# Patient Record
Sex: FEMALE | Race: WHITE | NOT HISPANIC OR LATINO | Employment: OTHER | URBAN - METROPOLITAN AREA
[De-identification: names, ages, dates, MRNs, and addresses within clinical notes are randomized per-mention and may not be internally consistent; named-entity substitution may affect disease eponyms.]

---

## 2017-12-21 ENCOUNTER — OFFICE VISIT (OUTPATIENT)
Dept: OBSTETRICS AND GYNECOLOGY | Age: 62
End: 2017-12-21

## 2017-12-21 VITALS
DIASTOLIC BLOOD PRESSURE: 90 MMHG | HEIGHT: 68 IN | WEIGHT: 147 LBS | BODY MASS INDEX: 22.28 KG/M2 | SYSTOLIC BLOOD PRESSURE: 132 MMHG

## 2017-12-21 DIAGNOSIS — Z12.4 ROUTINE CERVICAL SMEAR: ICD-10-CM

## 2017-12-21 DIAGNOSIS — Z11.51 SPECIAL SCREENING EXAMINATION FOR HUMAN PAPILLOMAVIRUS (HPV): ICD-10-CM

## 2017-12-21 DIAGNOSIS — B37.31 VULVOVAGINITIS DUE TO CANDIDA: ICD-10-CM

## 2017-12-21 DIAGNOSIS — B37.31 CANDIDA VAGINITIS: ICD-10-CM

## 2017-12-21 DIAGNOSIS — Z01.89 ENCOUNTER FOR URINE TEST: ICD-10-CM

## 2017-12-21 DIAGNOSIS — Z01.419 WELL WOMAN EXAM WITH ROUTINE GYNECOLOGICAL EXAM: Primary | ICD-10-CM

## 2017-12-21 LAB
BILIRUB BLD-MCNC: NEGATIVE MG/DL
CLARITY, POC: CLEAR
COLOR UR: YELLOW
GLUCOSE UR STRIP-MCNC: ABNORMAL MG/DL
KETONES UR QL: NEGATIVE
LEUKOCYTE EST, POC: NEGATIVE
NITRITE UR-MCNC: NEGATIVE MG/ML
PH UR: 5.5 [PH] (ref 5–8)
PROT UR STRIP-MCNC: NEGATIVE MG/DL
RBC # UR STRIP: NEGATIVE /UL
SP GR UR: 1.02 (ref 1–1.03)
UROBILINOGEN UR QL: NORMAL

## 2017-12-21 PROCEDURE — 81002 URINALYSIS NONAUTO W/O SCOPE: CPT | Performed by: OBSTETRICS & GYNECOLOGY

## 2017-12-21 PROCEDURE — 99386 PREV VISIT NEW AGE 40-64: CPT | Performed by: OBSTETRICS & GYNECOLOGY

## 2017-12-21 RX ORDER — VENLAFAXINE 25 MG/1
25 TABLET ORAL 2 TIMES DAILY
COMMUNITY
End: 2018-07-26

## 2017-12-21 RX ORDER — LAMOTRIGINE 100 MG/1
100 TABLET ORAL DAILY
COMMUNITY

## 2017-12-21 RX ORDER — FLUCONAZOLE 150 MG/1
TABLET ORAL
Qty: 3 TABLET | Refills: 2 | Status: SHIPPED | OUTPATIENT
Start: 2017-12-21 | End: 2018-01-26

## 2017-12-21 RX ORDER — LISINOPRIL 5 MG/1
5 TABLET ORAL DAILY
COMMUNITY
End: 2021-08-18

## 2017-12-21 RX ORDER — TRAZODONE HYDROCHLORIDE 50 MG/1
50 TABLET ORAL EVERY MORNING
COMMUNITY
End: 2019-05-21

## 2017-12-21 RX ORDER — SIMVASTATIN 10 MG
10 TABLET ORAL NIGHTLY
COMMUNITY
End: 2019-05-21

## 2017-12-27 LAB
CYTOLOGIST CVX/VAG CYTO: ABNORMAL
CYTOLOGY CVX/VAG DOC THIN PREP: ABNORMAL
DX ICD CODE: ABNORMAL
DX ICD CODE: ABNORMAL
HIV 1 & 2 AB SER-IMP: ABNORMAL
HPV I/H RISK 4 DNA CVX QL PROBE+SIG AMP: NEGATIVE
OTHER STN SPEC: ABNORMAL
PATH REPORT.FINAL DX SPEC: ABNORMAL
PATHOLOGIST CVX/VAG CYTO: ABNORMAL
STAT OF ADQ CVX/VAG CYTO-IMP: ABNORMAL

## 2017-12-28 LAB
A VAGINAE DNA VAG QL NAA+PROBE: ABNORMAL SCORE
BVAB2 DNA VAG QL NAA+PROBE: ABNORMAL SCORE
C ALBICANS DNA VAG QL NAA+PROBE: POSITIVE
C GLABRATA DNA VAG QL NAA+PROBE: NEGATIVE
MEGA1 DNA VAG QL NAA+PROBE: ABNORMAL SCORE
T VAGINALIS RRNA SPEC QL NAA+PROBE: NEGATIVE

## 2018-01-02 ENCOUNTER — HOSPITAL ENCOUNTER (OUTPATIENT)
Dept: MAMMOGRAPHY | Facility: HOSPITAL | Age: 63
Discharge: HOME | End: 2018-01-02
Attending: OBSTETRICS & GYNECOLOGY | Admitting: OBSTETRICS & GYNECOLOGY

## 2018-01-02 ENCOUNTER — HOSPITAL ENCOUNTER (OUTPATIENT)
Dept: ULTRASOUND IMAGING | Facility: HOSPITAL | Age: 63
Discharge: HOME | End: 2018-01-02

## 2018-01-02 ENCOUNTER — TELEPHONE (OUTPATIENT)
Dept: OBSTETRICS AND GYNECOLOGY | Age: 63
End: 2018-01-02

## 2018-01-02 DIAGNOSIS — R92.8 MAMMOGRAM ABNORMAL: ICD-10-CM

## 2018-01-02 DIAGNOSIS — R92.1 BREAST CALCIFICATION SEEN ON MAMMOGRAM: ICD-10-CM

## 2018-01-02 DIAGNOSIS — N64.89 BREAST ASYMMETRY: ICD-10-CM

## 2018-01-02 DIAGNOSIS — R92.2 BREAST DENSITY: ICD-10-CM

## 2018-01-02 PROCEDURE — 76642 ULTRASOUND BREAST LIMITED: CPT

## 2018-01-02 PROCEDURE — 77065 DX MAMMO INCL CAD UNI: CPT

## 2018-01-02 NOTE — TELEPHONE ENCOUNTER
----- Message from Malik Pandey MD sent at 12/31/2017  4:42 PM EST -----  Notify Shy that the vaginal NuSwab test did show Candida albicans, most common cause of yeast infections in women.  The Diflucan should clear this up.  But also the Pap smear came back and showed dysplasia but was negative for high-risk HPV.  This might be secondary to the yeast infection.  After she is finished the Diflucan we need to look at her cervix with the colposcope, sometime in January, to make sure there is not any significant dysplasia present.

## 2018-01-09 ENCOUNTER — TELEPHONE (OUTPATIENT)
Dept: MAMMOGRAPHY | Age: 63
End: 2018-01-09

## 2018-01-09 NOTE — TELEPHONE ENCOUNTER
Informed pt her follow up mammogram recommends Left breast stereotactic biopsy and Left Ultrasound guided biopsy, along with 6 month ultrasound follow up on Right breast.  [facility] 1130.   Orders in EPIC for biopsies and 6 month follow up.

## 2018-01-18 ENCOUNTER — HOSPITAL ENCOUNTER (OUTPATIENT)
Dept: ULTRASOUND IMAGING | Facility: HOSPITAL | Age: 63
End: 2018-01-18

## 2018-01-19 ENCOUNTER — HOSPITAL ENCOUNTER (OUTPATIENT)
Dept: MAMMOGRAPHY | Facility: HOSPITAL | Age: 63
Discharge: HOME | End: 2018-01-19
Attending: OBSTETRICS & GYNECOLOGY

## 2018-01-26 ENCOUNTER — HOSPITAL ENCOUNTER (OUTPATIENT)
Dept: MAMMOGRAPHY | Facility: HOSPITAL | Age: 63
Discharge: HOME | End: 2018-01-26
Attending: OBSTETRICS & GYNECOLOGY

## 2018-01-26 ENCOUNTER — HOSPITAL ENCOUNTER (OUTPATIENT)
Dept: ULTRASOUND IMAGING | Facility: HOSPITAL | Age: 63
Discharge: HOME | End: 2018-01-26
Attending: OBSTETRICS & GYNECOLOGY | Admitting: OBSTETRICS & GYNECOLOGY

## 2018-01-26 VITALS
TEMPERATURE: 97.4 F | OXYGEN SATURATION: 98 % | WEIGHT: 144 LBS | HEIGHT: 68 IN | RESPIRATION RATE: 18 BRPM | SYSTOLIC BLOOD PRESSURE: 123 MMHG | BODY MASS INDEX: 21.82 KG/M2 | HEART RATE: 98 BPM | DIASTOLIC BLOOD PRESSURE: 70 MMHG

## 2018-01-26 VITALS
OXYGEN SATURATION: 95 % | DIASTOLIC BLOOD PRESSURE: 77 MMHG | HEART RATE: 101 BPM | RESPIRATION RATE: 18 BRPM | SYSTOLIC BLOOD PRESSURE: 124 MMHG

## 2018-01-26 DIAGNOSIS — N63.0 BREAST MASS: ICD-10-CM

## 2018-01-26 DIAGNOSIS — R92.1 BREAST CALCIFICATION SEEN ON MAMMOGRAM: ICD-10-CM

## 2018-01-26 PROCEDURE — 88305 TISSUE EXAM BY PATHOLOGIST: CPT | Performed by: OBSTETRICS & GYNECOLOGY

## 2018-01-26 PROCEDURE — A4648 IMPLANTABLE TISSUE MARKER: HCPCS

## 2018-01-26 RX ORDER — LIDOCAINE HYDROCHLORIDE 10 MG/ML
20 INJECTION, SOLUTION INFILTRATION; PERINEURAL ONCE
Status: COMPLETED | OUTPATIENT
Start: 2018-01-26 | End: 2018-01-26

## 2018-01-26 RX ADMIN — LIDOCAINE HYDROCHLORIDE 9 ML: 10 INJECTION, SOLUTION INFILTRATION; PERINEURAL at 14:25

## 2018-01-26 RX ADMIN — LIDOCAINE HYDROCHLORIDE 20 ML: 10 INJECTION, SOLUTION INFILTRATION; PERINEURAL at 13:25

## 2018-01-26 NOTE — PROGRESS NOTES
See VS pre op for sterotactic breast bx  Taken about 1200 for complete set of VS. Pt having 2 separate breast bx today, US to follow sterotactic

## 2018-01-27 ENCOUNTER — TELEPHONE (OUTPATIENT)
Dept: INTERVENTIONAL RADIOLOGY/VASCULAR | Facility: HOSPITAL | Age: 63
End: 2018-01-27

## 2018-01-29 LAB
CYTO UR: NORMAL
LAB AP CASE REPORT: NORMAL
LAB AP CLINICAL INFORMATION: NORMAL
Lab: NORMAL
PATH REPORT.FINAL DX SPEC: NORMAL
PATH REPORT.GROSS SPEC: NORMAL

## 2018-01-30 ENCOUNTER — TELEPHONE (OUTPATIENT)
Dept: OBSTETRICS AND GYNECOLOGY | Age: 63
End: 2018-01-30

## 2018-02-18 DIAGNOSIS — R92.8 ABNORMALITY OF BOTH BREASTS ON SCREENING MAMMOGRAM: Primary | ICD-10-CM

## 2018-02-18 DIAGNOSIS — Z98.890 S/P LEFT BREAST BIOPSY: ICD-10-CM

## 2018-06-26 ENCOUNTER — HOSPITAL ENCOUNTER (OUTPATIENT)
Dept: ULTRASOUND IMAGING | Facility: HOSPITAL | Age: 63
Discharge: HOME | End: 2018-06-26
Attending: OBSTETRICS & GYNECOLOGY | Admitting: OBSTETRICS & GYNECOLOGY

## 2018-06-26 ENCOUNTER — OFFICE VISIT (OUTPATIENT)
Dept: OBSTETRICS AND GYNECOLOGY | Age: 63
End: 2018-06-26

## 2018-06-26 ENCOUNTER — HOSPITAL ENCOUNTER (OUTPATIENT)
Dept: MAMMOGRAPHY | Facility: HOSPITAL | Age: 63
Discharge: HOME | End: 2018-06-26
Attending: OBSTETRICS & GYNECOLOGY

## 2018-06-26 VITALS
SYSTOLIC BLOOD PRESSURE: 132 MMHG | DIASTOLIC BLOOD PRESSURE: 86 MMHG | WEIGHT: 142 LBS | HEIGHT: 62 IN | BODY MASS INDEX: 26.13 KG/M2

## 2018-06-26 DIAGNOSIS — Z98.890 S/P LEFT BREAST BIOPSY: ICD-10-CM

## 2018-06-26 DIAGNOSIS — N60.01 BREAST CYST, RIGHT: ICD-10-CM

## 2018-06-26 DIAGNOSIS — IMO0001 IDDM (INSULIN DEPENDENT DIABETES MELLITUS): ICD-10-CM

## 2018-06-26 DIAGNOSIS — R87.612 LGSIL OF CERVIX OF UNDETERMINED SIGNIFICANCE: ICD-10-CM

## 2018-06-26 DIAGNOSIS — A63.0 GENITAL WARTS: ICD-10-CM

## 2018-06-26 DIAGNOSIS — Z72.0 TOBACCO ABUSE: ICD-10-CM

## 2018-06-26 DIAGNOSIS — R92.8 ABNORMALITY OF BOTH BREASTS ON SCREENING MAMMOGRAM: ICD-10-CM

## 2018-06-26 DIAGNOSIS — Z00.00 ROUTINE HEALTH MAINTENANCE: Primary | ICD-10-CM

## 2018-06-26 PROCEDURE — 77066 DX MAMMO INCL CAD BI: CPT

## 2018-06-26 PROCEDURE — 76642 ULTRASOUND BREAST LIMITED: CPT

## 2018-06-26 PROCEDURE — 99213 OFFICE O/P EST LOW 20 MIN: CPT | Performed by: PHYSICIAN ASSISTANT

## 2018-06-26 RX ORDER — BLOOD-GLUCOSE METER
1 KIT MISCELLANEOUS 4 TIMES DAILY
Qty: 1 EACH | Refills: 0 | Status: SHIPPED | OUTPATIENT
Start: 2018-06-26

## 2018-06-26 RX ORDER — ERGOCALCIFEROL 1.25 MG/1
CAPSULE ORAL
Refills: 0 | COMMUNITY
Start: 2018-05-23 | End: 2019-05-21

## 2018-06-26 RX ORDER — IMIQUIMOD 12.5 MG/.25G
CREAM TOPICAL 3 TIMES WEEKLY
Qty: 24 EACH | Refills: 1 | Status: SHIPPED | OUTPATIENT
Start: 2018-06-27 | End: 2018-09-25 | Stop reason: SDUPTHER

## 2018-06-26 NOTE — PROGRESS NOTES
"Subjective     Chief Complaint   Patient presents with   • Gynecologic Exam     c/o vaginal bumps needs rx for blood monitor and strips is diabetic       Shy Yates is a 63 y.o.  whose LMP is No LMP recorded. Patient is postmenopausal. presents with skin tag and lesions on her labia.  Says she has had something similar to this on her face as well.  She first started with them a few years ago.  She feels like she gets pimples that won't go away.  She did end up seeing a Plastic Surgeon for this (Dr wright) and he has removed one before and said it was carcinoma. She hasn't been back.  She is diabetic and sees Dr Hanna.  BS have been stable.  Past year sugars were high.    She denies STD risk  She denies prodromal sx's.  Seen for first visit with Dr Pandey in December. Moved from [place].   for 33 years.   2 ya. Has a new partner.  Denies h/o abnormal pap    No Additional Complaints Reported    The following portions of the patient's history were reviewed and updated as appropriate:vital signs, allergies, current medications, past family history, past medical history, past social history, past surgical history and problem list      Review of Systems   A comprehensive review of systems was negative except for: Genitourinary: positive for genital lesions     Objective      /86   Ht 157.5 cm (62\")   Wt 64.4 kg (142 lb)   BMI 25.97 kg/m²     Physical Exam    General:   alert, comfortable and no distress   Heart: Not performed today   Lungs: Not performed today.   Breast: Not performed today   Neck: na   Abdomen: {Not performed today   CVA: Not performed today   Pelvis: External genitalia: warts throughout labia majora and minora and perirectal  Vaginal: normal mucosa without prolapse or lesions  Cervix: normal appearance   Extremities: Not performed today   Neurologic: negative   Psychiatric: Normal affect, judgement, and mood       Lab Review   Labs: pap reviewed from Dec, LGSIL, " colpo indicated     Imaging   No data reviewed    Assessment/Plan     ASSESSMENT  1. Routine health maintenance    2. Genital warts    3. LGSIL of cervix of undetermined significance    4. IDDM (insulin dependent diabetes mellitus)    5. Tobacco abuse        PLAN  1.   Orders Placed This Encounter   Procedures   • Ambulatory Referral to Gastroenterology       2. Medications prescribed this encounter:        New Medications Ordered This Visit   Medications   • imiquimod (ALDARA) 5 % cream     Sig: Apply  topically 3 (Three) Times a Week. Qhs, wash off in am     Dispense:  24 each     Refill:  1   • glucose monitor monitoring kit     Si each 4 (Four) Times a Day.     Dispense:  1 each     Refill:  0     Requests Accucheck   • glucose blood test strip     Sig: Use as instructed     Dispense:  120 each     Refill:  12       3. Disc findings with pt.  Will start with aldara for 3 months.  May require laser tx.  She needs to schedule her colpo with Dr hillman and can reassess the warts at that time and possibly discuss surgical tx.  Pt was pretty surprised/upset by this finding.  I did give her info to review and encouraged her to discuss her dx with her boyfriend. Also discussed how a stable BS would improve/prevent outbreaks and enc tob cessation for the same reason. All questions answered    4. Pt requested a referal for a CSC. Also requested an order for a glucometer and test strips     Follow up: 1 month(s) for colpo    YANE Valdes  2018

## 2018-06-27 ENCOUNTER — TELEPHONE (OUTPATIENT)
Dept: OBSTETRICS AND GYNECOLOGY | Age: 63
End: 2018-06-27

## 2018-07-11 ENCOUNTER — PROCEDURE VISIT (OUTPATIENT)
Dept: OBSTETRICS AND GYNECOLOGY | Age: 63
End: 2018-07-11

## 2018-07-11 VITALS
WEIGHT: 145 LBS | SYSTOLIC BLOOD PRESSURE: 138 MMHG | HEIGHT: 62 IN | DIASTOLIC BLOOD PRESSURE: 80 MMHG | BODY MASS INDEX: 26.68 KG/M2

## 2018-07-11 DIAGNOSIS — Z11.51 SCREENING FOR HUMAN PAPILLOMAVIRUS: ICD-10-CM

## 2018-07-11 DIAGNOSIS — R87.619 ABNORMAL CYTOLOGICAL FINDING IN SPECIMEN FROM CERVIX UTERI: ICD-10-CM

## 2018-07-11 DIAGNOSIS — N87.9 CERVICAL DYSPLASIA: ICD-10-CM

## 2018-07-11 DIAGNOSIS — Z91.89 HIGH RISK FOR CERVICAL CANCER: ICD-10-CM

## 2018-07-11 DIAGNOSIS — Z12.4 ENCOUNTER FOR REPEAT PAPANICOLAOU SMEAR OF CERVIX: ICD-10-CM

## 2018-07-11 DIAGNOSIS — R87.612 PAP SMEAR ABNORMALITY OF CERVIX WITH LGSIL: Primary | ICD-10-CM

## 2018-07-11 PROCEDURE — 56501 DESTROY VULVA LESIONS SIM: CPT | Performed by: OBSTETRICS & GYNECOLOGY

## 2018-07-11 PROCEDURE — 57454 BX/CURETT OF CERVIX W/SCOPE: CPT | Performed by: OBSTETRICS & GYNECOLOGY

## 2018-07-11 RX ORDER — BLOOD-GLUCOSE METER
EACH MISCELLANEOUS 4 TIMES DAILY
Refills: 0 | COMMUNITY
Start: 2018-06-26

## 2018-07-12 NOTE — PROGRESS NOTES
"COLPOSCOPY EXAM                         7/11/2018     Subjective        Shy Yates is a 63 y.o., G 5, P 4014, White  Female    Indication: LGSIL on Pap smear in December 2017.  It was negative for HR-HPV.  We had called with this report but there was no answer and so we sent a letter, but apparently the patient never sought, for she did not realize she had mild dysplasia on her previous Pap smear.  It is the first abnormal Pap smear she has had.    In addition the patient had an outbreak of multiple genital warts on both labia majora and perianally.  This was a first time event.  She is treating the area with Aldara with some response.    Pap Smear History: Never had abnormal Pap smears.  2015 had negative Pap smear, negative HR-HPV.  2017 Pap smear with LGSIL as mentioned above.    : Reviewed in detail with drawings.  Patient expressed understanding.    Objective         /80   Ht 157.5 cm (62\")   Wt 65.8 kg (145 lb)   Breastfeeding? No   BMI 26.52 kg/m²     EXAM       Vulva: Normal.  There are multiple flat and some raised condylomata of both labia majora and a row of micro-condylomata between the right labia minora and majora.  There are several in the anterior commissure, and there were several at the anus.  She denies ever having anal intercourse.  Her boyfriend says that he has not had any condylomata.  I recommended that he see a urologist.      Her , from whom she has been  for 10 years, had a penchant for prostitutes apparently, and also had a wartlike lesion as well.    COLPOSCOPY  Speculum placed in vagina, visualization of the cervix is adequate.   3 cotton balls soaked with vinegar placed in the vagina and left for 1 minute or more, then removed.     Examination of the cervix with the colposcope revealed:    PORTIO OF THE CERVIX=  is normal.  SCJ= right at the external os.  Just inside the squamocolumnar junction there is white epithelium at 12:00 and at 6:00 and " is suggestive of dysplasia.    ENDOCERVICAL CANAL evaluated with a Erendira Endocervical Speculum and Q-tips with vinegar= No abnormalities seen.     TISSUE SAMPLES obtained: 3  Endocervical curettings obtained with a Kevorkian curette ([facility]).  2 Cervical biopsy(s) at 12 and 6:00 (multiple samples taken from each). Then painted with Monsel's with good hemostasis.     VAGINA was examined as the speculum was removed slowly: No abnormalities were seen.  No warts were seen.     EXTERNAL GENITALIA, VULVA, AND PERIANAL AREA were then examined with the colposcope= multiple slightly raised condylomata seen and some relatively flat condylomata seen, and some of the warts were more prominent.  All these areas were painted with 25% podophyllin.  It was a fairly large area of skin that was painted.  The patient washed off and 2 hours.  I cautioned her about toxicity effects.  I believe I used less than 0.5 cc's actually on the patient.       ADEQUACY OF EXAM: Adequate.    The entire transformation zone and endocervical canal were visualized .  The vagina and vulva were completely visualized.     Assessment/Plan   Shy was seen today for colposcopy.    Diagnoses and all orders for this visit:    Pap smear abnormality of cervix with LGSIL  -     Colposcopy  -     Reference Histopathology    Encounter for repeat Papanicolaou smear of cervix  -     PapIG, HPV, Rfx 16 / 18    Screening for human papillomavirus  -     Cancel: Reference Histopathology  -     PapIG, HPV, Rfx 16 / 18    Cervical dysplasia  -     Cancel: Reference Histopathology  -     PapIG, HPV, Rfx 16 / 18  -     Reference Histopathology    High risk for cervical cancer  -     Cancel: Reference Histopathology  -     PapIG, HPV, Rfx 16 / 18    Abnormal cytological finding in specimen from cervix uteri   -     PapIG, HPV, Rfx 16 / 18  -     Reference Histopathology    Other orders  -     Cancel: PapIG, HPV, Rfx 16 / 18        COLPOSCOPY ASSESSMENT  FINDINGS:  Endocervical  canal had white epithelium just at the outer edge of the canal at 12 and 6:00, both biopsied.  Cervix was normal.   Vagina normal.  Vulva with multiple condyloma to on both labia and the anus.     The patient tolerated the procedure very well.   The findings were explained, and drawings were made.  All questions were answered, the patient demonstrated understanding.    PLAN: The biopsy results.  I expect we will need to go to the OR to remove the area of white epithelium at the squamocolumnar junction of the cervical canal.,  I would prefer treating the areas of condylomata with the Aldara and periodic podophyllin until the labia look normal and then we will proceed to remove the areas of dysplasia in the cervix In the OR.    She will return in 2 weeks for reevaluation.    Malik Pandey MD    7/11/2018  10:38 PM

## 2018-07-17 LAB
CYTOLOGIST CVX/VAG CYTO: ABNORMAL
CYTOLOGY CVX/VAG DOC THIN PREP: ABNORMAL
DX ICD CODE: ABNORMAL
DX ICD CODE: ABNORMAL
HIV 1 & 2 AB SER-IMP: ABNORMAL
HPV I/H RISK 1 DNA CVX QL PROBE+SIG AMP: POSITIVE
OTHER STN SPEC: ABNORMAL
PATH REPORT.FINAL DX SPEC: ABNORMAL
PATHOLOGIST CVX/VAG CYTO: ABNORMAL
STAT OF ADQ CVX/VAG CYTO-IMP: ABNORMAL

## 2018-07-18 LAB
DX ICD CODE: NORMAL
DX ICD CODE: NORMAL
PATH REPORT.FINAL DX SPEC: NORMAL
PATH REPORT.GROSS SPEC: NORMAL
PATH REPORT.SITE OF ORIGIN SPEC: NORMAL
PATHOLOGIST NAME: NORMAL
PAYMENT PROCEDURE: NORMAL

## 2018-07-19 NOTE — PROGRESS NOTES
Notify Shy that the repeat Pap smear showed mild dysplasia and the cervical biopsies also showed mild dysplasia.  The endocervical curettings were negative.  I will review all this and check her vulva when she comes back in a week for follow-up.

## 2018-07-26 ENCOUNTER — OFFICE VISIT (OUTPATIENT)
Dept: OBSTETRICS AND GYNECOLOGY | Age: 63
End: 2018-07-26

## 2018-07-26 VITALS
BODY MASS INDEX: 26.87 KG/M2 | WEIGHT: 146 LBS | SYSTOLIC BLOOD PRESSURE: 158 MMHG | DIASTOLIC BLOOD PRESSURE: 82 MMHG | HEIGHT: 62 IN

## 2018-07-26 DIAGNOSIS — Z72.0 CURRENTLY ATTEMPTING TO QUIT SMOKING: ICD-10-CM

## 2018-07-26 DIAGNOSIS — Z13.89 SCREENING FOR HEMATURIA OR PROTEINURIA: Primary | ICD-10-CM

## 2018-07-26 DIAGNOSIS — A63.0 GENITAL WARTS: ICD-10-CM

## 2018-07-26 DIAGNOSIS — N87.0 CERVICAL DYSPLASIA, MILD: ICD-10-CM

## 2018-07-26 LAB
BILIRUB BLD-MCNC: NEGATIVE MG/DL
CLARITY, POC: CLEAR
COLOR UR: YELLOW
GLUCOSE UR STRIP-MCNC: NEGATIVE MG/DL
KETONES UR QL: NEGATIVE
LEUKOCYTE EST, POC: ABNORMAL
NITRITE UR-MCNC: NEGATIVE MG/ML
PH UR: 7.5 [PH] (ref 5–8)
PROT UR STRIP-MCNC: ABNORMAL MG/DL
RBC # UR STRIP: NEGATIVE /UL
SP GR UR: 1.02 (ref 1–1.03)
UROBILINOGEN UR QL: NORMAL

## 2018-07-26 PROCEDURE — 56501 DESTROY VULVA LESIONS SIM: CPT | Performed by: OBSTETRICS & GYNECOLOGY

## 2018-07-26 PROCEDURE — 81002 URINALYSIS NONAUTO W/O SCOPE: CPT | Performed by: OBSTETRICS & GYNECOLOGY

## 2018-07-26 PROCEDURE — 99213 OFFICE O/P EST LOW 20 MIN: CPT | Performed by: OBSTETRICS & GYNECOLOGY

## 2018-07-26 RX ORDER — LISINOPRIL AND HYDROCHLOROTHIAZIDE 20; 12.5 MG/1; MG/1
1 TABLET ORAL DAILY
Refills: 1 | COMMUNITY
Start: 2018-07-18 | End: 2019-05-21

## 2018-07-26 RX ORDER — BUPROPION HYDROCHLORIDE 150 MG/1
150 TABLET, EXTENDED RELEASE ORAL EVERY 12 HOURS SCHEDULED
Qty: 60 TABLET | Refills: 3 | Status: SHIPPED | OUTPATIENT
Start: 2018-07-26 | End: 2019-05-21

## 2018-07-26 RX ORDER — LAMOTRIGINE 25 MG/1
50 TABLET ORAL DAILY
Refills: 3 | COMMUNITY
Start: 2018-07-19 | End: 2021-08-18

## 2018-07-26 NOTE — PROGRESS NOTES
"And regular area on the level of the fascia this is THAT GYN PROBLEM EXAM                                    2018    Subjective   Shy Yates is a 63 y.o.  Female,      Chief complaint:  Follow-up (Shy had colpo on  (condylomata).   She would like prescription for Chantix. )    History of Present Illness:  Follow-up of condylomata acuminata.  Shy had extensive condylomata on the labia minora and majora, perineum, and the perianal area when seen for her annual exam on  by Krystin..  She has been treating this with Aldara cream since then with some response.     We did a colposcopy because she had a Pap smear last November or December that showed mild dysplasia but she had never had any follow-up since then.  She was then seen by Krystin on  for her annual exam, and was found to have this outbreak of genital warts and her history of mild dysplasia without follow-up.       She then had colposcopy on .  Pap smear did show LGSIL and was positive for high-risk HPV. In the areas that were suspicious for dysplasia just inside the squamocolumnar junction at 12 and 6:00 both are positive for LGSIL on biopsy.     I also treated the condylomata of the labia with extensive paining of podophyllin 25%.  She was cautioned about the possible toxicities of this, but she didn't have much of a reaction at all.       She has continued with the Aldara treatment, and is back today for a repeat podophyllin treatment.       She would also like a prescription for Chantix or Wellbutrin to help her stop smoking.  She has become more committed to smoking cessation.    Review of Systems   : No change in discharge.  No itching or irritation.  No dysuria.    /82   Ht 157.5 cm (62\")   Wt 66.2 kg (146 lb)   Breastfeeding? No   BMI 26.70 kg/m²     Objective   OBGyn Exam     PHYSICAL EXAM      Well-developed, well-nourished, slightly overweight  female, in no distress, " alert and well oriented ×3.        Pelvic exam :  Vulva normal.  But the external genitalia have multiple small flat condyloma and some raised condylomas.  These do appear to have decreased since last visit.  The perineum is definitely better.  The small linear cluster of tiny condylomata in the right interlabial sulcus have markedly decreased.  The perianal condylomata  have decreased.      The condyloma toe were again treated with podophyllin.  The patient tolerated this very well.          .      Mood and affect is normal.  Behavior normal.  Thought content normal.            Judgment normal.         Assessment/Plan   Shy was seen today for follow-up.    Diagnoses and all orders for this visit:    Screening for hematuria or proteinuria  -     POC Urinalysis Dipstick    Currently attempting to quit smoking  Comments:  Discussed Chantix and Wellbutrin, will give prescription for Wellbutrin to start.  Orders:  -     buPROPion SR (WELLBUTRIN SR) 150 MG 12 hr tablet; Take 1 tablet by mouth Every 12 (Twelve) Hours.    Genital warts  Comments:  Treated extensively with podophyllin.  We'll continue to treat with Aldara every other day.  Return to office in 3 weeks.     Cervical dysplasia, mild      COMMENTS: I will see back in several weeks and we will treat again with podophyllin.  In the meantime she will continue treating with Aldara 3 times a week.  We may need to go to the OR to loop out dysplasia from the cervix, and also to remove some of the dysplastic areas from the skin.    Malik Pandey MD  7/28/2018

## 2018-07-28 PROBLEM — A63.0 GENITAL WARTS: Status: ACTIVE | Noted: 2018-07-28

## 2018-07-28 PROBLEM — N87.0 CERVICAL DYSPLASIA, MILD: Status: ACTIVE | Noted: 2018-07-28

## 2018-08-16 ENCOUNTER — OFFICE VISIT (OUTPATIENT)
Dept: OBSTETRICS AND GYNECOLOGY | Age: 63
End: 2018-08-16

## 2018-08-16 VITALS
WEIGHT: 144 LBS | SYSTOLIC BLOOD PRESSURE: 140 MMHG | HEIGHT: 62 IN | BODY MASS INDEX: 26.5 KG/M2 | DIASTOLIC BLOOD PRESSURE: 90 MMHG

## 2018-08-16 DIAGNOSIS — A63.0 GENITAL WARTS: Primary | ICD-10-CM

## 2018-08-16 PROCEDURE — 99212 OFFICE O/P EST SF 10 MIN: CPT | Performed by: OBSTETRICS & GYNECOLOGY

## 2018-08-16 PROCEDURE — 56501 DESTROY VULVA LESIONS SIM: CPT | Performed by: OBSTETRICS & GYNECOLOGY

## 2018-08-16 NOTE — PROGRESS NOTES
GYN PROBLEM EXAM                                    2018    Subjective   Shy Yates is a 63 y.o.  Female,      Chief complaint:  Vulvar condylomata.  For repeat podophyllin or BCA treatment.    History of Present Illness:  Shy is back for repeat treatment of multiple condylomata of both labia and vulva.       HPI of office visit on 2018: Follow-up of condylomata acuminata.  Shy had extensive condylomata on the labia minora and majora, perineum, and the perianal area when seen for her annual exam on  by Krystin..  She has been treating this with Aldara cream since then with some response.     We did a colposcopy because she had a Pap smear last November or December that showed mild dysplasia but she had never had any follow-up since then.  She was then seen by Krystin on  for her annual exam, and was found to have this outbreak of genital warts and her history of mild dysplasia without follow-up.       She then had colposcopy on .  Pap smear did show LGSIL and was positive for high-risk HPV. In the areas that were suspicious for dysplasia just inside the squamocolumnar junction at 12 and 6:00 both are positive for LGSIL on biopsy.     I also treated the condylomata of the labia with extensive paining of podophyllin 25%.  She was cautioned about the possible toxicities of this, but she didn't have much of a reaction at all.        She has continued with the Aldara treatment, and is back today for a repeat podophyllin treatment.        She would also like a prescription for Chantix or Wellbutrin to help her stop smoking.  She has become more committed to smoking cessation.    The following portions of the patient's history were reviewed / updated as appropriate:   allergies, current medications,  past medical history, past surgical history, past family history, past social history, and problem list.    Review of Systems no dysuria or pain.  Some  "minor irritation.    /90   Ht 157.5 cm (62\")   Wt 65.3 kg (144 lb)   BMI 26.34 kg/m²     Objective   OBGyn Exam     PHYSICAL EXAM      Well-developed, well-nourished, slightly overweight  female, in no distress, alert and well oriented ×3.        Pelvic exam :  Vulva normal.           External genitalia with no open sores.  The small condylomas at the anterior commissure have not changed.  There are a couple of other condylomata at the introitus at 6:00 and at the top of the introitus right at about 0030 and on the right labia that were all treated with TCA.  The rest of the condylomatous areas show multiple small to medium sized bumps that have decreased in their appearance but are still present.  The perineum and perianal area seems to be clear of condylomata.     These generalized areas mentioned above were treated with podophyllin generously again.  This time she'll leave it on for 3 hours.  She will continue using her Aldara cream.       The patient tolerated the procedure well.         Mood and affect is normal.  Behavior normal.  Thought content normal.            Judgment normal.         Assessment/Plan   Shy was seen today for follow-up.    Diagnoses and all orders for this visit:    Genital warts      COMMENTS: Condyloma to have decreased.  The most persistent ones are treated with TCA today and all the other much smaller sites are treated with podophyllin.    Malik Pandey MD  8/19/2018      "

## 2018-09-11 ENCOUNTER — OFFICE VISIT (OUTPATIENT)
Dept: OBSTETRICS AND GYNECOLOGY | Age: 63
End: 2018-09-11

## 2018-09-11 VITALS
WEIGHT: 146.4 LBS | DIASTOLIC BLOOD PRESSURE: 90 MMHG | SYSTOLIC BLOOD PRESSURE: 142 MMHG | BODY MASS INDEX: 22.19 KG/M2 | HEIGHT: 68 IN

## 2018-09-11 DIAGNOSIS — A63.0 GENITAL WARTS: Primary | ICD-10-CM

## 2018-09-11 DIAGNOSIS — B08.1 MOLLUSCUM CONTAGIOSUM: ICD-10-CM

## 2018-09-11 PROCEDURE — 99212 OFFICE O/P EST SF 10 MIN: CPT | Performed by: OBSTETRICS & GYNECOLOGY

## 2018-09-11 PROCEDURE — 56501 DESTROY VULVA LESIONS SIM: CPT | Performed by: OBSTETRICS & GYNECOLOGY

## 2018-09-11 RX ORDER — BUPROPION HYDROCHLORIDE 150 MG/1
150 TABLET ORAL EVERY 12 HOURS
Refills: 3 | COMMUNITY
Start: 2018-09-04 | End: 2018-09-25 | Stop reason: SDUPTHER

## 2018-09-11 NOTE — PROGRESS NOTES
" GYN PROBLEM EXAM                                    2018    Subjective   Shy Yates is a 63 y.o.  Female,      Chief complaint:  Follow-up (genital warts)    History of Present Illness:  Shy has been treating a collection of confluent condyloma to of both labia majora with Aldara cream 3 times a week, for the last 3 weeks.  3 weeks ago we treated some of the larger, more obvious condylomata with TCA.  These showed good resolution.  The other larger areas were painted once again with podophyllin.  She has not had a negative reaction to the podophyllin.     She is back today for follow-up treatment.    The following portions of the patient's history were reviewed / updated as appropriate:   allergies, current medications,  past medical history, past surgical history, past family history, past social history, and problem list.    Review of Systems no problems with vulvar pain or dysuria.    /90   Ht 172.7 cm (68\")   Wt 66.4 kg (146 lb 6.4 oz)   LMP  (LMP Unknown)   BMI 22.26 kg/m²     Objective   Physical Exam   Genitourinary:         Genitourinary Comments: Black= definite condyloma.  Red= area of presumed confluent multiple flat condyloma.  Green= small linear area of papillary change, presumed condylomatous change.        PHYSICAL EXAM      Well-developed, well-nourished, thin  female, in no distress, alert and well oriented ×3.          Abdomen is flat, soft and nontender.         Pelvic exam :        Vulva: There are several apparent molluscum contagiosum on the mons pubis.  I reassured her about these & explained about the human papova virus, and reassured her that they will generally just resolve.       External genitalia: Both labia majora are covered with multiple flat presumed condylomata that seem to be getting smaller.  There are couple of larger single condyloma bilaterally that are obvious.  There are also multiple tiny areas that are " suspicious for being early condylomata as well.  There is also a small linear collection in the right interlabial sulcus.  There is been resolution of the large condyloma at the anterior commissure and partial resolution of a condyloma at 6:00 just at the introitus.  This was treated with the last remaining of the TCA available.  The remaining areas were painted with podophyllin once again.  She is instructed away 3 hours before she washes the podophyllin off this time.         Mood and affect is normal.  Behavior normal.  Thought content normal.            Judgment normal.         Assessment/Plan   Shy was seen today for follow-up.    Diagnoses and all orders for this visit:    Genital warts      COMMENTS: These areas of presumed condyloma acuminata are slow to resolve.  We'll see back in 2 weeks and consider bilateral labial biopsies to be certain of the diagnosis.  She is tolerating the treatment well.  I'm still recommending against coitus at the present time.    Malik Pandey MD  9/12/2018  (late chart completion)

## 2018-09-12 PROBLEM — B08.1 MOLLUSCUM CONTAGIOSUM: Status: ACTIVE | Noted: 2018-09-12

## 2018-09-25 ENCOUNTER — OFFICE VISIT (OUTPATIENT)
Dept: OBSTETRICS AND GYNECOLOGY | Age: 63
End: 2018-09-25

## 2018-09-25 DIAGNOSIS — A63.0 CONDYLOMA: Primary | ICD-10-CM

## 2018-09-25 PROCEDURE — 56501 DESTROY VULVA LESIONS SIM: CPT | Performed by: OBSTETRICS & GYNECOLOGY

## 2018-09-25 RX ORDER — IMIQUIMOD 12.5 MG/.25G
CREAM TOPICAL 3 TIMES WEEKLY
Qty: 24 EACH | Refills: 1 | Status: SHIPPED | OUTPATIENT
Start: 2018-09-26 | End: 2019-05-21

## 2018-09-25 RX ORDER — VENLAFAXINE HYDROCHLORIDE 75 MG/1
75 CAPSULE, EXTENDED RELEASE ORAL DAILY
COMMUNITY
Start: 2018-08-17 | End: 2019-05-21

## 2018-09-25 RX ORDER — TRAZODONE HYDROCHLORIDE 100 MG/1
TABLET ORAL
COMMUNITY
Start: 2018-08-17 | End: 2019-05-21

## 2018-09-25 RX ORDER — SIMVASTATIN 20 MG
20 TABLET ORAL NIGHTLY
COMMUNITY
Start: 2018-08-17

## 2018-09-26 ENCOUNTER — TELEPHONE (OUTPATIENT)
Dept: OBSTETRICS AND GYNECOLOGY | Age: 63
End: 2018-09-26

## 2018-09-26 LAB — RPR SER QL: NORMAL

## 2018-10-07 NOTE — PROGRESS NOTES
" GYN PROBLEM EXAM                                    2018.    Subjective   Shy Yates is a 63 y.o.  Female,      Chief complaint:  Follow-up (Condyloma )    History of Present Illness:  Shy is back for podophyllin treatment of condyloma to acuminata of both labia majora.  She has been continuing to use Aldara cream, revealed a night.         The following portions of the patient's history were reviewed / updated as appropriate:   allergies, current medications,  past medical history, past surgical history, past family history, past social history, and problem list.    Review of Systems no complaints of ulceration, vaginal bleeding, dysuria, or pruritus.    /90   Ht 157.5 cm (62\")   Wt 66.7 kg (147 lb)   LMP  (LMP Unknown)   Breastfeeding? No   BMI 26.89 kg/m²     Objective   OBGyn Exam     PHYSICAL EXAM      Well-developed, well-nourished, overweight  female., in no distress, alert and well oriented ×3.         Pelvic exam :  Vulva normal.  Molluscum contagiosum still present on the mons pubis.          External genitalia: There is been some improvement of the condylomata, but continues to have multiple flat elevated skin areas, that looked like chronic condylomata, that have only slightly decreased with the treatment of podophyllin and Aldara.             This time, I treated 2 large patches of the condyloma with TCA.  One large patch on each labia majora.  It was very painful with lots of burning but she is able to tolerate it.  The other areas also treated with podophyllin as usual.               Mood and affect is normal.  Behavior normal.  Thought content normal.            Judgment normal.         Assessment/Plan   Shy was seen today for follow-up.    Diagnoses and all orders for this visit:    Condyloma  -     RPR  -     imiquimod (ALDARA) 5 % cream; Apply  topically to the appropriate area as directed 3 (Three) Times a Week. Qhs, wash off in " am        COMMENTS: We will see if the TCA does any better in resolving these lesions.  RPR to make sure these are not condyloma william.    Malik Pandey MD  10/7/2018 (late note completion).

## 2018-10-10 ENCOUNTER — OFFICE VISIT (OUTPATIENT)
Dept: OBSTETRICS AND GYNECOLOGY | Age: 63
End: 2018-10-10

## 2018-10-10 VITALS
SYSTOLIC BLOOD PRESSURE: 118 MMHG | DIASTOLIC BLOOD PRESSURE: 64 MMHG | BODY MASS INDEX: 22.01 KG/M2 | HEIGHT: 68 IN | WEIGHT: 145.2 LBS

## 2018-10-10 DIAGNOSIS — A63.0 GENITAL WARTS: Primary | ICD-10-CM

## 2018-10-10 PROCEDURE — 56501 DESTROY VULVA LESIONS SIM: CPT | Performed by: OBSTETRICS & GYNECOLOGY

## 2018-10-10 PROCEDURE — 99212 OFFICE O/P EST SF 10 MIN: CPT | Performed by: OBSTETRICS & GYNECOLOGY

## 2018-10-11 NOTE — PROGRESS NOTES
" GYN PROBLEM EXAM                                    10/10/2018    Subjective   Shy Yates is a 63 y.o.  Female,      Chief complaint:  Follow-up (condyloma treatment. Pt feels they have gotten better. )    History of Present Illness:  Shy has done okay since the last visit but the labia burning like fire for about a week.  However it did seem to markedly decrease the nodular lesions on both labia majora.    The following portions of the patient's history were reviewed / updated as appropriate:   allergies, current medications,  past medical history, past surgical history, past family history, past social history, and problem list.    Review of Systems No dysuria, no vaginal itching or irritation.  They have not been sexually active.    /64   Ht 172.7 cm (68\")   Wt 65.9 kg (145 lb 3.2 oz)   LMP  (LMP Unknown)   BMI 22.08 kg/m²     Objective   Physical Exam   Genitourinary:         Genitourinary Comments: The blue/gr elliptical areas are the areas of resolution following previous TCA treatment.  The red areas are current sites of TCA treatment.        PHYSICAL EXAM      Well-developed, well-nourished, thin  female, in no distress, alert and well oriented ×3.        Pelvic exam :  Vulva normal.           External genitalia: Both labia majora have large patches of depigmentation at the site of the previous TCA treatment of the labia.  The nodular lesions have resolved.  There are still multiple small nodular lesions present but I believe they are slowly decreasing.  BUS negative.             Introitus normal.  Vaginal mucosa is a little atrophic, with some white discharge.          Cervix normal, atrophic.  No lesions seen in the vagina.         Mood and affect is normal.  Behavior normal.  Thought content normal.            Judgment normal.             Assessment/Plan   Shy was seen today for follow-up.    Diagnoses and all orders for this visit:    Genital " warts    COMMENTS: The TCA seems to have improved/removed the lesions on the labia majora.  Repeat treatment today of lesions high on the labia majora and in the interlabial fold.  I recommended she try some baking soda applied to these areas before she goes to bed to try and limit the burning sensation.  I also gave her some 2% viscus Xylocaine jelly to apply if the baking soda doesn't work.    Malik Pandey MD  10/10/2018

## 2018-10-25 ENCOUNTER — OFFICE VISIT (OUTPATIENT)
Dept: OBSTETRICS AND GYNECOLOGY | Age: 63
End: 2018-10-25

## 2018-10-25 VITALS
BODY MASS INDEX: 22.28 KG/M2 | SYSTOLIC BLOOD PRESSURE: 158 MMHG | WEIGHT: 147 LBS | DIASTOLIC BLOOD PRESSURE: 90 MMHG | HEIGHT: 68 IN

## 2018-10-25 VITALS
DIASTOLIC BLOOD PRESSURE: 80 MMHG | SYSTOLIC BLOOD PRESSURE: 122 MMHG | WEIGHT: 148 LBS | HEIGHT: 68 IN | BODY MASS INDEX: 22.43 KG/M2

## 2018-10-25 DIAGNOSIS — A63.0 GENITAL WARTS: Primary | ICD-10-CM

## 2018-10-25 PROCEDURE — 99212 OFFICE O/P EST SF 10 MIN: CPT | Performed by: OBSTETRICS & GYNECOLOGY

## 2018-10-25 NOTE — PROGRESS NOTES
" GYN PROBLEM EXAM                                    10/31/2018    Subjective   Shy Yates is a 63 y.o.  Female,      Chief complaint:  Follow-up (Condyloma treatment)    History of Present Illness:  Follow-up treatment for bilateral labial condylomata.  We have been treating every 2-3 weeks with podophyllin at first and then the last couple times with bilateral TCA.  The last time did not burn nearly as much as her previous treatments had burned.    The following portions of the patient's history were reviewed / updated as appropriate:   allergies, current medications,  past medical history, past surgical history, past family history, past social history, and problem list.    Review of Systems: No dysuria or change in vaginal discharge.    /80   Ht 172.7 cm (68\")   Wt 67.1 kg (148 lb)   LMP  (LMP Unknown)   Breastfeeding? No   BMI 22.50 kg/m²     Objective   OBGyn Exam     PHYSICAL EXAM      Well-developed, well-nourished, thin  female, in no distress, alert and well oriented ×3.          Pelvic exam :  Vulva normal.           External genitalia much improved.  Still has some raised areas on the labia majora, that I think are flat skin tags.          BUS negative.             Introitus normal.          Mood and affect is normal.  Behavior normal.  Thought content normal.            Judgment normal.         Assessment/Plan   Shy was seen today for follow-up.    Diagnoses and all orders for this visit:    Genital warts      COMMENTS: The labia have much improved.  We will stop hammering away at the labia and reevaluate in 4-8 weeks.  They may have intercourse again.    Malik Pandey MD  10/31/2018      "

## 2019-01-02 ENCOUNTER — CONVERSION ENCOUNTER (OUTPATIENT)
Dept: CARDIOLOGY | Facility: CLINIC | Age: 64
End: 2019-01-02
Attending: INTERNAL MEDICINE

## 2019-05-21 ENCOUNTER — OFFICE VISIT (OUTPATIENT)
Dept: OBSTETRICS AND GYNECOLOGY | Age: 64
End: 2019-05-21

## 2019-05-21 VITALS
SYSTOLIC BLOOD PRESSURE: 132 MMHG | WEIGHT: 146.4 LBS | BODY MASS INDEX: 22.19 KG/M2 | DIASTOLIC BLOOD PRESSURE: 72 MMHG | HEIGHT: 68 IN

## 2019-05-21 DIAGNOSIS — Z12.4 PAP SMEAR FOR CERVICAL CANCER SCREENING: ICD-10-CM

## 2019-05-21 DIAGNOSIS — Z11.51 SCREENING FOR HPV (HUMAN PAPILLOMAVIRUS): ICD-10-CM

## 2019-05-21 DIAGNOSIS — R87.619 ABNORMAL CYTOLOGICAL FINDING IN SPECIMEN FROM CERVIX UTERI: ICD-10-CM

## 2019-05-21 DIAGNOSIS — N76.0 ACUTE VAGINITIS: ICD-10-CM

## 2019-05-21 DIAGNOSIS — A63.0 CONDYLOMA: Primary | ICD-10-CM

## 2019-05-21 DIAGNOSIS — G47.00 INSOMNIA, UNSPECIFIED TYPE: ICD-10-CM

## 2019-05-21 DIAGNOSIS — Z87.410 HISTORY OF CERVICAL DYSPLASIA: ICD-10-CM

## 2019-05-21 DIAGNOSIS — Z01.419 WELL WOMAN EXAM WITH ROUTINE GYNECOLOGICAL EXAM: ICD-10-CM

## 2019-05-21 PROCEDURE — 99213 OFFICE O/P EST LOW 20 MIN: CPT | Performed by: OBSTETRICS & GYNECOLOGY

## 2019-05-21 PROCEDURE — 99396 PREV VISIT EST AGE 40-64: CPT | Performed by: OBSTETRICS & GYNECOLOGY

## 2019-05-21 RX ORDER — VENLAFAXINE HYDROCHLORIDE 150 MG/1
150 CAPSULE, EXTENDED RELEASE ORAL DAILY
Refills: 1 | COMMUNITY
Start: 2019-03-12 | End: 2022-08-29

## 2019-05-21 RX ORDER — ZOLPIDEM TARTRATE 5 MG/1
5 TABLET ORAL NIGHTLY PRN
Qty: 30 TABLET | Refills: 0 | Status: SHIPPED | OUTPATIENT
Start: 2019-05-21

## 2019-05-21 RX ORDER — HYDROCODONE BITARTRATE AND ACETAMINOPHEN 5; 325 MG/1; MG/1
TABLET ORAL
Refills: 0 | COMMUNITY
Start: 2019-04-23 | End: 2019-05-21

## 2019-05-21 RX ORDER — FLUCONAZOLE 150 MG/1
TABLET ORAL
Qty: 2 TABLET | Refills: 3 | Status: SHIPPED | OUTPATIENT
Start: 2019-05-21 | End: 2019-08-13 | Stop reason: HOSPADM

## 2019-05-21 NOTE — PROGRESS NOTES
"Follow-Up GYN Problem Exam but Also Routine Annual Visit    May 21, 2019  Patient: Shy Yates          MR#:[id number]    Chief Complaint   Patient presents with   • Gynecologic Exam     AE       64 y.o. female  who presents for six-month follow-up exam for multiple labial condylomata treated with multiple repeated treatments with podophyllin or bichloracetic acid.  But she is also due for her annual exam.    HISTORY OF PRESENT ILLNESS:    GYN Complaints: None.  Doing well.  She occasionally has vulvar itching when her blood sugars out of control.  Yesterday her blood sugars were 400.  She is taking her medications but she is just not following her diet.  We discussed this seriously.  She knows she needs to exercise better control.    Other Complaints: None.    GYN HISTORY:    1.  Menstrual Hx: Menopause.      No HRT .         Current contraception: post menopausal status    2.  History of abnormal Pap smear: yes - LGSIL on Pap smear in  and on cervical biopsy in 2018.         Pap smear Hx:   \"Never had abnormal Paps\" before I saw her.    , neg pap (atrophic pattern, predominantly parabasal cells) , neg HR-HPV.    Dec, 2017, LGSIL, negative HR-HPV.  Attempted to contact patient about this until we were able to get her in for a colposcopy until 2018.  Colposcopy shows multiple flat raised condylomata on both labia majora and a row of micro-condyloma in between the right labia minora and majora.  Several in the anterior commissure.  Several at the anus.  Colposcopy of the cervix showed white epithelium at 12 and 6:00, biopsied= ECC negative, biopsies at 6 and 12 show LGSIL.  Repeat Pap at that time= 2018, LGSIL, positive HR-HPV.  After that she was seen multiple times treating the labia with either podophyllin or bichloracetic acid or trichloracetic acid until finally all the condylomata resolved.    PAST MEDICAL HISTORY:       has a current medication list which includes the following " "prescription(s): accu-chek dharmesh plus, glucose blood, glucose monitor, lamotrigine, lamotrigine, liraglutide, lisinopril, metformin, simvastatin, venlafaxine xr, fluconazole, tinidazole, and zolpidem.    3.  New Medical Hx:  None.        Past Medical History:   Diagnosis Date   • Abnormal mammogram of left breast    • Anemia    • Brain bleed (CMS/[facility]) 2014    Had a brain bleed as a child and then again as an adult in . Neurologist, Dr Miguel A Lynn.   • Chorea    • Colon polyps 2007    Found at her 1st colonoscopy at age 52.   • Depression       2 yr ago.   • Genital warts 2018:  Extensive on both labia majora and minora, perineum and perianally.  Treated multiple times with podophyllin or BCA or TCA.  May 21, 2019: All have totally resolved.   • History of Papanicolaou smear of cervix     Pap smear Hx: \"Never had abnormal Paps\" before I saw her.  , neg pap (atrophic pattern, predominantly parabasal cells) , neg HR-HPV.  Dec, 2017, LGSIL, negative HR-HPV.  Attempted to contact patient about this until we were able to get her in for a colposcopy on 2018.    • History of Papanicolaou smear of cervix 2018: Colposcopy shows multiple flat raised condylomata on both labia majora and a row of micro-condyloma in between the right labia minora and majora.  Several in the anterior commissure.  Several at the anus.  Colposcopy showed white epithelium at 12 and 6:00, biopsied= ECC negative, biopsies at 6 and 12 show LGSIL.  Repeat Pap at that time= 2018, LGSIL, positive HR-HPV.   • Hyperlipemia     On Zocor for 6 months.   • Hypertension     Long Hx    • IDDM (insulin dependent diabetes mellitus) (CMS/[facility])     IDDM: After meals. Last Hgb A1c= 7.5.    • Insomnia     Started melatonin last couple of nights. Doing better.   • LGSIL on Pap smear of cervix     See note of history of Pap smear, 2018   • Menopause    • Migraine headache     No Aura. " "Resolved.   • Molluscum contagiosum 2018   • Pain in left hand     Carpel tunnel syndrome.   •  (vaginal birth after )        4.  New Surgical Hx:  Left carpel tunnel surgery and removal of a ganglion cyst.       Past Surgical History:   Procedure Laterality Date   • BREAST BIOPSY Left     X2  OPEN    • CARPAL TUNNEL RELEASE Left 2018, 2019    Carpel Tunnel Release Twice.   •  SECTION N/A     Baby Boy \"KEV\"   • COLONOSCOPY      POLYPS   • CYST REMOVAL Bilateral     Right Wrist, Left Hand.  ? Ganglion Cysts.   • D&C WITH SUCTION N/A     Spont .   • POSTPARTUM TUBAL LIGATION      Interval, 6 months after    • SKIN CANCER EXCISION Right     Jaw Area, ? Squamous Carcinoma.       5.  New Family Medical Hx:  None.         Family History   Problem Relation Age of Onset   • Stroke Father    • Diabetes type II Father    • Kidney cancer Father    • Breast cancer Sister 40   • Hypertension Maternal Grandmother    • Diabetes Maternal Grandmother    • Stroke Paternal Grandfather 90   • No Known Problems Son    • No Known Problems Mother          @ 93.   • Hypertension Maternal Grandfather    • Diabetes Maternal Grandfather    • Stroke Maternal Grandfather    • Diabetes Paternal Grandmother    • Heart failure Paternal Grandmother    • Rectal cancer Brother         Late 50's   • Diabetes type II Daughter         IDDM   • No Known Problems Maternal Aunt         90's   • No Known Problems Paternal Aunt         80's   • No Known Problems Maternal Aunt         90's   • No Known Problems Paternal Aunt         93 yoa   • BRCA 1/2 Neg Hx    • Colon cancer Neg Hx    • Endometrial cancer Neg Hx    • Ovarian cancer Neg Hx        6.  Social History     Tobacco Use   Smoking Status Current Every Day Smoker   • Packs/day: 1.00   • Types: Cigarettes   • Last attempt to quit:    • Years since quittin.4   Smokeless Tobacco Never Used   Tobacco Comment    Has tried " "chantix twice.         Review of Systems      SCREENINGS:  =Family history of breast cancer: yes - Sister at 40. Daughters BRCA negative.  =Family history of ovarian cancer: no  =Family history of uterine cancer: no  =Family History of colon cancer: no    Perform regular self breast exam: yes - fairly regularly.  Breast self-examination technique  reviewed and the patient encouraged to perform self breast exams monthly.     Mammogram: ordered.  Colonoscopy: recommended.  DEXA: not indicated.      LIFESTYLE:  =Diet: Healthy.   = We discussed healthy lifestyle modifications.      =Exercise:  no.   =I recommended 30 minutes of aerobic exercise 5 times a week.     =Calcium:  no.  =Vitamin D:  no.   = We discussed calcium intake needs to help prevent osteoporosis:      Recommended 600 mg of calcium daily and 1000 IUs of vitamin D 3 daily.        Objective     /72   Ht 172.7 cm (68\")   Wt 66.4 kg (146 lb 6.4 oz)   LMP  (LMP Unknown)   Breastfeeding? No   BMI 22.26 kg/m²     PHYSICAL EXAM    OBGyn Exam    Constitutional: Well-developed, well-nourished, thin  female, in no distress, alert and well oriented ×3.    Skin is warm, dry, without rash.       Neck: Normal, trachea in the midline.  Thyroid exam normal. No carotid bruits bilaterally.  No cervical lymphadenopathy.    Back: Normal.  No CVA tenderness.    Lungs: Clear to auscultation.  Chest wall appears normal.    Heart:: Regular Rhythm without murmur or gallop.    Breasts: Regular self breast exam strongly encouraged.        Right breast nontender, without dominant mass.  No nipple discharge.            No superficial skin changes.  No axillary adenopathy.        Left breast nontender, without dominant mass.  No nipple discharge.            No superficial skin changes.  No axillary adenopathy.      Abdomen: Flat, soft and nontender.No palpable mass.           No hepatosplenomegaly.  Flanks are negative.          Bowel sounds normal.  No abdominal " bruit.          No inguinal lymphadenopathy bilaterally.     Pelvic exam :  Vulva normal.           External genitalia with a thin white coating of the medial aspect of the labia minora and majora, typical of a yeast infection.  The labia majora now look completely normal with no evidence of the previous condylomatous seen.           BUS negative.             Introitus open with fair amount of watery white discharge.  Vaginal mucosa with some decrease in rugae.  Wet prep surprisingly does not show yeast, or BV or Trichomonas.  I am a little uncertain about Candida glabrata.  Vaginal NuSwab for vaginitis taken.          Cervix normal, Pap with HPV, no unusual discharge from the cervix.  No cervical motion tenderness.          Uterus midplane, normal size, normal shape, and position. Not tender.          Adnexa are negative bilaterally.  No tenderness.  No palpable mass.          Rectovaginal exam negative.      Musc /Skel: Lower extremities without edema.     Neuro: Coordination is grossly normal.  Gait is normal.    Psych: Mood and affect is normal.  Behavior normal.  Thought content normal.            Judgment normal.       =All questions were answered.      Assessment/Plan   Shy was seen today for gynecologic exam.    Diagnoses and all orders for this visit:    Condyloma  Comments:  Vulvar condyloma to have resolved.    Well woman exam with routine gynecological exam  -     PapIG, HPV, Rfx 16 / 18    Pap smear for cervical cancer screening  -     PapIG, HPV, Rfx 16 / 18    Screening for HPV (human papillomavirus)  -     PapIG, HPV, Rfx 16 / 18    History of cervical dysplasia  -     PapIG, HPV, Rfx 16 / 18    Abnormal cytological finding in specimen from cervix uteri   -     PapIG, HPV, Rfx 16 / 18    Acute vaginitis  -     NuSwab Vaginitis (VG) - Swab, Vagina  -     fluconazole (DIFLUCAN) 150 MG tablet; TAKE ONE TABLET TODAY AND REPEAT IN THREE DAYS    Insomnia, unspecified type  -     zolpidem (AMBIEN) 5 MG  tablet; Take 1 tablet by mouth At Night As Needed for Sleep.      COMMENTS: Fairly copious watery white vaginal discharge suspicious for yeast vaginitis, but wet prep seem negative.  Vaginal NuSwab taken.  Discussed the possibility of using vaginal estrogen less some of this is early atrophic vaginitis.  She states she has not had intercourse for quite a while.     Mammogram ordered and colonoscopy recommended.  Next year she will follow-up with Dr. Mohan.    Malik Pandey MD  6/3/2019 (late note completion)

## 2019-05-24 LAB
A VAGINAE DNA VAG QL NAA+PROBE: ABNORMAL SCORE
BVAB2 DNA VAG QL NAA+PROBE: ABNORMAL SCORE
C ALBICANS DNA VAG QL NAA+PROBE: NEGATIVE
C GLABRATA DNA VAG QL NAA+PROBE: NEGATIVE
MEGA1 DNA VAG QL NAA+PROBE: ABNORMAL SCORE
T VAGINALIS RRNA SPEC QL NAA+PROBE: NEGATIVE

## 2019-05-26 PROBLEM — B08.1 MOLLUSCUM CONTAGIOSUM: Status: RESOLVED | Noted: 2018-09-12 | Resolved: 2019-05-26

## 2019-05-26 PROBLEM — A63.0 GENITAL WARTS: Status: RESOLVED | Noted: 2018-07-28 | Resolved: 2019-05-26

## 2019-05-28 DIAGNOSIS — Z12.31 VISIT FOR SCREENING MAMMOGRAM: ICD-10-CM

## 2019-05-28 DIAGNOSIS — Z12.11 ENCOUNTER FOR SCREENING COLONOSCOPY: ICD-10-CM

## 2019-05-28 DIAGNOSIS — B96.89 BV (BACTERIAL VAGINOSIS): Primary | ICD-10-CM

## 2019-05-28 DIAGNOSIS — N76.0 BV (BACTERIAL VAGINOSIS): Primary | ICD-10-CM

## 2019-05-28 LAB
CYTOLOGIST CVX/VAG CYTO: ABNORMAL
CYTOLOGY CVX/VAG DOC CYTO: ABNORMAL
CYTOLOGY CVX/VAG DOC THIN PREP: ABNORMAL
DX ICD CODE: ABNORMAL
HIV 1 & 2 AB SER-IMP: ABNORMAL
HPV I/H RISK 1 DNA CVX QL PROBE+SIG AMP: POSITIVE
HPV16 DNA CVX QL PROBE+SIG AMP: NEGATIVE
HPV18 DNA CVX QL PROBE+SIG AMP: NEGATIVE
Lab: ABNORMAL
OTHER STN SPEC: ABNORMAL
STAT OF ADQ CVX/VAG CYTO-IMP: ABNORMAL

## 2019-05-28 RX ORDER — TINIDAZOLE 500 MG/1
TABLET ORAL
Qty: 8 TABLET | Refills: 0 | Status: SHIPPED | OUTPATIENT
Start: 2019-05-28 | End: 2019-08-12

## 2019-05-28 NOTE — PROGRESS NOTES
May 28, 2019.  7:40 PM  I notified Shy that her vaginal NuSwab test is intermediate for BV, 0 for Atopobium vaginae and BVAB 2 but +2 for Megasphera 1.  So I would recommend that we treat her with tinidazole, 2 g p.o. daily x2 days.  However, surprisingly, she noticed marked improvement in the vaginal / vulvar itching after taking 1 Diflucan 150 mg tablet.  Even though the vaginal NuSwab test was negative for yeast and for trichomonas.  I recommended that she take the BV antibiotics and save the second Diflucan tablet for if she starts having symptoms again.  I have also placed an order for her mammogram in late June at [facility] and a colonoscopy with Dr. Elijah Em here at [facility].

## 2019-05-29 ENCOUNTER — PREP FOR SURGERY (OUTPATIENT)
Dept: OTHER | Facility: HOSPITAL | Age: 64
End: 2019-05-29

## 2019-05-29 DIAGNOSIS — Z12.11 ENCOUNTER FOR SCREENING FOR MALIGNANT NEOPLASM OF COLON: Primary | ICD-10-CM

## 2019-05-29 RX ORDER — SODIUM CHLORIDE, SODIUM LACTATE, POTASSIUM CHLORIDE, CALCIUM CHLORIDE 600; 310; 30; 20 MG/100ML; MG/100ML; MG/100ML; MG/100ML
30 INJECTION, SOLUTION INTRAVENOUS CONTINUOUS
Status: CANCELLED | OUTPATIENT
Start: 2019-07-16

## 2019-05-30 PROBLEM — Z12.11 ENCOUNTER FOR SCREENING FOR MALIGNANT NEOPLASM OF COLON: Status: ACTIVE | Noted: 2019-05-30

## 2019-06-06 ENCOUNTER — TELEPHONE (OUTPATIENT)
Dept: OBSTETRICS AND GYNECOLOGY | Age: 64
End: 2019-06-06

## 2019-06-06 NOTE — TELEPHONE ENCOUNTER
----- Message from Malik Pandey MD sent at 6/3/2019  9:50 PM EDT -----  Shelly 3, 2019.  9:46 PM  I called Shy about her Pap smear result that was negative for dysplasia or atypical cells, but was positive for HR–HPV, but negative for genotype 16 and 18.  Last year when he did a colposcopy we saw what looks like mild dysplasia and the biopsy confirmed mild dysplasia in her Pap smear at that time also showed the mild dysplasia.     So I feel reassured with these results that she is actually developing antibodies against the HPV, with resolution of the dysplasia, and the next we will expect the HPV test also be negative.  So I recommended a repeat Pap smear in 6 months with Dr. Mohan, and assuming that does not show any dysplasia, then repeat the Pap smear again 6 months later at her annual exam with Dr. Mohan.

## 2019-06-07 NOTE — TELEPHONE ENCOUNTER
FYI Pt scheduled 6 months (11/21/2019) from her last pap 05/21/2019. Pt was adamant about being exactly 6 months for insurance purposes.

## 2019-07-10 ENCOUNTER — TELEPHONE (OUTPATIENT)
Dept: GASTROENTEROLOGY | Facility: CLINIC | Age: 64
End: 2019-07-10

## 2019-07-10 NOTE — TELEPHONE ENCOUNTER
Called left message I need her paper work. For upcoming colonoscopy 7-16-19. She is to arrive at 9 am.

## 2019-07-11 ENCOUNTER — HOSPITAL ENCOUNTER (OUTPATIENT)
Dept: MAMMOGRAPHY | Facility: HOSPITAL | Age: 64
Discharge: HOME | End: 2019-07-11

## 2019-08-09 ENCOUNTER — TELEPHONE (OUTPATIENT)
Dept: GASTROENTEROLOGY | Facility: CLINIC | Age: 64
End: 2019-08-09

## 2019-08-12 RX ORDER — EMPAGLIFLOZIN 10 MG/1
1 TABLET, FILM COATED ORAL DAILY
Refills: 0 | COMMUNITY
Start: 2019-05-30 | End: 2022-08-29

## 2019-08-12 RX ORDER — CHOLECALCIFEROL (VITAMIN D3) 125 MCG
5 CAPSULE ORAL AS NEEDED
COMMUNITY
End: 2022-02-28

## 2019-08-13 ENCOUNTER — ANESTHESIA EVENT (OUTPATIENT)
Dept: GASTROENTEROLOGY | Facility: HOSPITAL | Age: 64
End: 2019-08-13

## 2019-08-13 ENCOUNTER — HOSPITAL ENCOUNTER (OUTPATIENT)
Facility: HOSPITAL | Age: 64
Setting detail: HOSPITAL OUTPATIENT SURGERY
Discharge: HOME | End: 2019-08-13
Attending: INTERNAL MEDICINE | Admitting: INTERNAL MEDICINE

## 2019-08-13 ENCOUNTER — ANESTHESIA (OUTPATIENT)
Dept: GASTROENTEROLOGY | Facility: HOSPITAL | Age: 64
End: 2019-08-13

## 2019-08-13 VITALS
WEIGHT: 143 LBS | TEMPERATURE: 97.8 F | RESPIRATION RATE: 18 BRPM | SYSTOLIC BLOOD PRESSURE: 138 MMHG | OXYGEN SATURATION: 99 % | HEIGHT: 68 IN | BODY MASS INDEX: 21.67 KG/M2 | HEART RATE: 74 BPM | DIASTOLIC BLOOD PRESSURE: 77 MMHG

## 2019-08-13 DIAGNOSIS — Z12.11 ENCOUNTER FOR SCREENING FOR MALIGNANT NEOPLASM OF COLON: ICD-10-CM

## 2019-08-13 LAB — GLUCOSE BLDC GLUCOMTR-MCNC: 179 MG/DL (ref 70–130)

## 2019-08-13 PROCEDURE — 82962 GLUCOSE BLOOD TEST: CPT

## 2019-08-13 PROCEDURE — 45385 COLONOSCOPY W/LESION REMOVAL: CPT | Performed by: INTERNAL MEDICINE

## 2019-08-13 PROCEDURE — 88305 TISSUE EXAM BY PATHOLOGIST: CPT | Performed by: INTERNAL MEDICINE

## 2019-08-13 PROCEDURE — 25010000002 PROPOFOL 10 MG/ML EMULSION: Performed by: ANESTHESIOLOGY

## 2019-08-13 PROCEDURE — 25010000002 MIDAZOLAM PER 1 MG: Performed by: ANESTHESIOLOGY

## 2019-08-13 RX ORDER — PROPOFOL 10 MG/ML
VIAL (ML) INTRAVENOUS CONTINUOUS PRN
Status: DISCONTINUED | OUTPATIENT
Start: 2019-08-13 | End: 2019-08-13 | Stop reason: SURG

## 2019-08-13 RX ORDER — MIDAZOLAM HYDROCHLORIDE 1 MG/ML
INJECTION INTRAMUSCULAR; INTRAVENOUS AS NEEDED
Status: DISCONTINUED | OUTPATIENT
Start: 2019-08-13 | End: 2019-08-13 | Stop reason: SURG

## 2019-08-13 RX ORDER — LIDOCAINE HYDROCHLORIDE 20 MG/ML
INJECTION, SOLUTION INFILTRATION; PERINEURAL AS NEEDED
Status: DISCONTINUED | OUTPATIENT
Start: 2019-08-13 | End: 2019-08-13 | Stop reason: SURG

## 2019-08-13 RX ORDER — SODIUM CHLORIDE, SODIUM LACTATE, POTASSIUM CHLORIDE, CALCIUM CHLORIDE 600; 310; 30; 20 MG/100ML; MG/100ML; MG/100ML; MG/100ML
30 INJECTION, SOLUTION INTRAVENOUS CONTINUOUS
Status: DISCONTINUED | OUTPATIENT
Start: 2019-08-13 | End: 2019-08-13 | Stop reason: HOSPADM

## 2019-08-13 RX ORDER — PROPOFOL 10 MG/ML
VIAL (ML) INTRAVENOUS AS NEEDED
Status: DISCONTINUED | OUTPATIENT
Start: 2019-08-13 | End: 2019-08-13 | Stop reason: SURG

## 2019-08-13 RX ADMIN — LIDOCAINE HYDROCHLORIDE 80 MG: 20 INJECTION, SOLUTION INFILTRATION; PERINEURAL at 10:32

## 2019-08-13 RX ADMIN — MIDAZOLAM 1 MG: 1 INJECTION INTRAMUSCULAR; INTRAVENOUS at 10:32

## 2019-08-13 RX ADMIN — ALFENTANIL HYDROCHLORIDE 500 MCG: 500 INJECTION, SOLUTION INTRAVENOUS at 10:31

## 2019-08-13 RX ADMIN — PROPOFOL 100 MG: 10 INJECTION, EMULSION INTRAVENOUS at 10:32

## 2019-08-13 RX ADMIN — PROPOFOL 100 MCG/KG/MIN: 10 INJECTION, EMULSION INTRAVENOUS at 10:32

## 2019-08-13 RX ADMIN — SODIUM CHLORIDE, POTASSIUM CHLORIDE, SODIUM LACTATE AND CALCIUM CHLORIDE 30 ML/HR: 600; 310; 30; 20 INJECTION, SOLUTION INTRAVENOUS at 09:54

## 2019-08-13 NOTE — ANESTHESIA POSTPROCEDURE EVALUATION
"Patient: Shy Yates    Procedure Summary     Date:  08/13/19 Room / [facility]:   [facility]    Anesthesia Start:  1025 Anesthesia Stop:  1101    Procedure:  COLONOSCOPY INTO CECUM WITH COLD SNARE POLYPECTOMY (N/A ) Diagnosis:       Encounter for screening for malignant neoplasm of colon      (Encounter for screening for malignant neoplasm of colon [Z12.11])    Surgeon:  Elijah Em MD Provider:  Romaine Agrawal MD    Anesthesia Type:  MAC ASA Status:  3          Anesthesia Type: MAC  Last vitals  BP   117/75 (08/13/19 1110)   Temp   36.6 °C (97.8 °F) (08/13/19 0944)   Pulse   75 (08/13/19 1110)   Resp   18 (08/13/19 1110)     SpO2   96 % (08/13/19 1110)     Post Anesthesia Care and Evaluation    Patient location during evaluation: bedside  Patient participation: complete - patient participated  Level of consciousness: awake and alert  Pain management: adequate  Airway patency: patent  Anesthetic complications: No anesthetic complications    Cardiovascular status: acceptable  Respiratory status: acceptable  Hydration status: acceptable    Comments: /75 (BP Location: Left arm, Patient Position: Lying)   Pulse 75   Temp 36.6 °C (97.8 °F) (Oral)   Resp 18   Ht 172.7 cm (67.99\")   Wt 64.9 kg (143 lb)   LMP  (LMP Unknown)   SpO2 96%   BMI 21.75 kg/m²       "

## 2019-08-13 NOTE — ANESTHESIA PREPROCEDURE EVALUATION
Anesthesia Evaluation     Patient summary reviewed and Nursing notes reviewed   no history of anesthetic complications:  NPO Solid Status: > 8 hours  NPO Liquid Status: > 2 hours           Airway   Mallampati: II  Dental      Pulmonary - normal exam   (+) a smoker Current,   Cardiovascular - normal exam    (+) hypertension less than 2 medications, hyperlipidemia,       Neuro/Psych  (+) headaches, psychiatric history Depression,     GI/Hepatic/Renal/Endo    (+)   diabetes mellitus type 1 using insulin,     Musculoskeletal     Abdominal    Substance History      OB/GYN          Other      history of cancer remission                    Anesthesia Plan    ASA 3     MAC     intravenous induction   Anesthetic plan, all risks, benefits, and alternatives have been provided, discussed and informed consent has been obtained with: patient.

## 2019-08-13 NOTE — H&P
"CC: Here for endoscopic evaluation.     HPI: Patient has no GI complaints and is here for routine colorectal cancer screening.       Past Medical History:   Diagnosis Date   • Abnormal mammogram of left breast    • Anemia    • Brain bleed (CMS/HCC)     Had a brain bleed as a child and then again as an adult in . Neurologist, Dr Miguel A Lynn.   • Chorea    • Colon polyps 2007    Found at her 1st colonoscopy at age 52.   • Depression       2 yr ago.   • Genital warts 2018:  Extensive on both labia majora and minora, perineum and perianally.  Treated multiple times with podophyllin or BCA or TCA.  May 21, 2019: All have totally resolved.   • History of Papanicolaou smear of cervix     Pap smear Hx: \"Never had abnormal Paps\" before I saw her.  , neg pap (atrophic pattern, predominantly parabasal cells) , neg HR-HPV.  Dec, 2017, LGSIL, negative HR-HPV.  Attempted to contact patient about this until we were able to get her in for a colposcopy on 2018.    • History of Papanicolaou smear of cervix 2018: Colposcopy shows multiple flat raised condylomata on both labia majora and a row of micro-condyloma in between the right labia minora and majora.  Several in the anterior commissure.  Several at the anus.  Colposcopy showed white epithelium at 12 and 6:00, biopsied= ECC negative, biopsies at 6 and 12 show LGSIL.  Repeat Pap at that time= 2018, LGSIL, positive HR-HPV.   • Hyperlipemia     On Zocor for 6 months.   • Hypertension     Long Hx    • IDDM (insulin dependent diabetes mellitus) (CMS/[facility])     IDDM: After meals. Last Hgb A1c= 7.5.    • Insomnia     Started melatonin last couple of nights. Doing better.   • LGSIL on Pap smear of cervix     See note of history of Pap smear, 2018   • Menopause    • Migraine headache     No Aura. Resolved.   • Molluscum contagiosum 2018   • Pain in left hand     Carpel tunnel syndrome.   •  " "(vaginal birth after )        Past Surgical History:   Procedure Laterality Date   • BREAST BIOPSY Left     X2  OPEN    • CARPAL TUNNEL RELEASE Left 2018, 2019    Carpel Tunnel Release Twice.   •  SECTION N/A     Baby Boy \"KEV\"   • COLONOSCOPY      POLYPS   • CYST REMOVAL Bilateral     Right Wrist, Left Hand.  ? Ganglion Cysts.   • D&C WITH SUCTION N/A     Spont .   • POSTPARTUM TUBAL LIGATION      Interval, 6 months after    • SKIN CANCER EXCISION Right     Jaw Area, ? Squamous Carcinoma.       Prior to Admission medications    Medication Sig Start Date End Date Taking? Authorizing Provider   Blood Glucose Monitoring Suppl (ACCU-CHEK ASPEN PLUS) w/Device kit 4 (Four) Times a Day. 18  Yes Heather Bermeo MD   fluconazole (DIFLUCAN) 150 MG tablet TAKE ONE TABLET TODAY AND REPEAT IN THREE DAYS 19  Yes Malik Pandey MD   glucose blood test strip Use as instructed 18  Yes Krystin Yu PA   glucose monitor monitoring kit 1 each 4 (Four) Times a Day. 18  Yes Krystin Yu PA   JARDIANCE 10 MG tablet Take 1 tablet by mouth Daily. 19  Yes Heather Bermeo MD   lamoTRIgine (LaMICtal) 100 MG tablet Take 100 mg by mouth Daily.   Yes Heather Bermeo MD   lamoTRIgine (LaMICtal) 25 MG tablet Take 50 mg by mouth Daily. PT CURRENTLY OUT 18  Yes Heather Bermeo MD   Liraglutide (VICTOZA) 18 MG/3ML solution pen-injector injection Inject  under the skin Daily.   Yes Heather Bermeo MD   lisinopril (PRINIVIL,ZESTRIL) 5 MG tablet Take 5 mg by mouth Daily.   Yes Heather Bermeo MD   melatonin 5 MG tablet tablet Take 5 mg by mouth Every Night.   Yes Heather Bermeo MD   metFORMIN (GLUCOPHAGE) 500 MG tablet Take 1,000 mg by mouth 2 (Two) Times a Day. 3/12/19  Yes Heather Bermeo MD   simvastatin (ZOCOR) 20 MG tablet Take 20 mg by mouth Every Night. 18  Yes Heather Bermeo MD "   venlafaxine XR (EFFEXOR-XR) 150 MG 24 hr capsule Take 150 mg by mouth Daily. 3/12/19  Yes Provider, MD Heather   zolpidem (AMBIEN) 5 MG tablet Take 1 tablet by mouth At Night As Needed for Sleep. 19  Yes Malik Pandey MD       Allergies   Allergen Reactions   • Aspirin Itching   • Penicillins Itching       Family History   Problem Relation Age of Onset   • Stroke Father    • Diabetes type II Father    • Kidney cancer Father    • Breast cancer Sister 40   • Hypertension Maternal Grandmother    • Diabetes Maternal Grandmother    • Stroke Paternal Grandfather 90   • No Known Problems Son    • No Known Problems Mother          @ 93.   • Hypertension Maternal Grandfather    • Diabetes Maternal Grandfather    • Stroke Maternal Grandfather    • Diabetes Paternal Grandmother    • Heart failure Paternal Grandmother    • Rectal cancer Brother         Late 50's   • Diabetes type II Daughter         IDDM   • No Known Problems Maternal Aunt         90's   • No Known Problems Paternal Aunt         80's   • No Known Problems Maternal Aunt         90's   • No Known Problems Paternal Aunt         93 yoa   • BRCA 1/2 Neg Hx    • Colon cancer Neg Hx    • Endometrial cancer Neg Hx    • Ovarian cancer Neg Hx    • Malig Hyperthermia Neg Hx        OBJECTIVE:    Patient's vital signs reviewed. No acute distress.     Chest is clear, no wheezing or rales. Normal symmetric air entry throughout both lung fields. No chest wall deformities or tenderness.    S1 and S2 normal, no murmurs, clicks, gallops or rubs. Regular rate and rhythm. Chest is clear; no wheezes or rales. No edema or JVD.    The abdomen is soft without tenderness, guarding, mass, rebound or organomegaly. Bowel sounds are normal. No CVA tenderness or inguinal adenopathy noted.    Patient is alert, oriented and with an intact memory.    Assessment: Patient has no GI complaints and is here for routine colorectal cancer screening.       Plan:  Colonoscopy.

## 2019-08-13 NOTE — DISCHARGE INSTRUCTIONS
For the next 24 hours patient needs to be with a responsible adult.    For 24 hours DO NOT drive, operate machinery, appliances, drink alcohol, make important decisions or sign legal documents.    Start with a light or bland diet if you are feeling sick to your stomach otherwise advance to regular diet as tolerated.    Follow recommendations on procedure report if provided by your doctor.    Call Dr Em for problems [phone]    Problems may include but not limited to: large amounts of bleeding, trouble breathing, repeated vomiting, severe unrelieved pain, fever or chills.

## 2019-08-14 LAB
CYTO UR: NORMAL
LAB AP CASE REPORT: NORMAL
PATH REPORT.FINAL DX SPEC: NORMAL
PATH REPORT.GROSS SPEC: NORMAL

## 2019-09-12 NOTE — NURSING NOTE
Both steriotactic and ultrasound breast bx on left breast complete. sterio bx to 1200 postion, US bx to 3 o'clock position.  Ice bags applied to both sites, secured by bra. Pt given dc instruction including copy of written, able to teach back dc bx info.  dc'd home ambulatory in good condition  
b/l shoulders joint

## 2019-11-21 ENCOUNTER — OFFICE VISIT (OUTPATIENT)
Dept: OBSTETRICS AND GYNECOLOGY | Age: 64
End: 2019-11-21

## 2019-11-21 VITALS
WEIGHT: 147.4 LBS | DIASTOLIC BLOOD PRESSURE: 78 MMHG | BODY MASS INDEX: 22.34 KG/M2 | HEIGHT: 68 IN | SYSTOLIC BLOOD PRESSURE: 132 MMHG

## 2019-11-21 DIAGNOSIS — Z12.4 SCREENING FOR MALIGNANT NEOPLASM OF CERVIX: ICD-10-CM

## 2019-11-21 DIAGNOSIS — N87.0 CERVICAL DYSPLASIA, MILD: Primary | ICD-10-CM

## 2019-11-21 DIAGNOSIS — B37.31 VULVOVAGINITIS DUE TO CANDIDA: ICD-10-CM

## 2019-11-21 PROCEDURE — 99213 OFFICE O/P EST LOW 20 MIN: CPT | Performed by: OBSTETRICS & GYNECOLOGY

## 2019-11-21 RX ORDER — CLOTRIMAZOLE AND BETAMETHASONE DIPROPIONATE 10; .64 MG/G; MG/G
CREAM TOPICAL 2 TIMES DAILY
Qty: 15 G | Refills: 1 | Status: SHIPPED | OUTPATIENT
Start: 2019-11-21 | End: 2021-08-18

## 2019-11-21 RX ORDER — FLUCONAZOLE 150 MG/1
150 TABLET ORAL DAILY
Qty: 1 TABLET | Refills: 0 | Status: SHIPPED | OUTPATIENT
Start: 2019-11-21 | End: 2022-02-28

## 2019-11-21 NOTE — PROGRESS NOTES
"Subjective     Chief Complaint   Patient presents with   • Follow-up     New pt. former dr hillman. repeat pap smear. last pap 19- (LGSIL) HPV (+)       Shy Yates is a 64 y.o.  who presents for 6 month pap.   Per Dr. Hillman notes: 2018: Colposcopy shows multiple flat raised condylomata on both labia majora and a row of micro-condyloma in between the right labia minora and majora.  Several in the anterior commissure.  Several at the anus.  Colposcopy showed white epithelium at 12 and 6:00, biopsied= ECC negative, biopsies at 6 and 12 show LGSIL.  Repeat Pap at that time= 2018, LGSIL, positive HR-HPV.    She notes vulvar irritation recently, feels as though it is related to her glucose being elevated. She has T2DM and recently her A1c was as high as 12. Was then started on Jardiance.    No Additional Complaints Reported    The following portions of the patient's history were reviewed and updated as appropriate:vital signs, allergies, current medications, past medical history, past social history, past surgical history and problem list      Objective      /78   Ht 172.7 cm (68\")   Wt 66.9 kg (147 lb 6.4 oz)   LMP  (LMP Unknown)   Breastfeeding? No   BMI 22.41 kg/m²     Physical Exam   Appears well and is in no distress today  Regular, nonlabored breathing  Vulva with no apparent condyloma, she does have signs of chronic irritation, pink discoloration of bilateral labia majora and minora, she has some thick white discharge adherent to the vulva and also thick white discharge of the vagina.  Her cervix appears normal today    Wet mount with some budding yeast    Assessment/Plan     Problems Addressed this Visit        Genitourinary    Vulvovaginitis due to Candida    Relevant Medications    fluconazole (DIFLUCAN) 150 MG tablet    Cervical dysplasia, mild - Primary        Repeat Pap collected today due to prior HPV positive Pap.  In 2018 had LSIL with positive HPV, this year " earlier she had NILM but positive HPV Pap  Cervix appears normal today    Does appear to have vulvovaginal candidiasis with significant irritation.  Clotrimazole/betamethasone cream to the pharmacy and also a tablet of Diflucan.    Follow up 6 months for annual exam or sooner if her vulvar complaints are not resolved.    Gloria Mohan MD  11/21/2019

## 2019-12-04 LAB
CONV .: ABNORMAL
CYTOLOGIST CVX/VAG CYTO: ABNORMAL
CYTOLOGY CVX/VAG DOC CYTO: ABNORMAL
CYTOLOGY CVX/VAG DOC THIN PREP: ABNORMAL
DX ICD CODE: ABNORMAL
DX ICD CODE: ABNORMAL
HIV 1 & 2 AB SER-IMP: ABNORMAL
HPV I/H RISK 4 DNA CVX QL PROBE+SIG AMP: POSITIVE
Lab: ABNORMAL
OTHER STN SPEC: ABNORMAL
PATHOLOGIST CVX/VAG CYTO: ABNORMAL
RECOM F/U CVX/VAG CYTO: ABNORMAL
STAT OF ADQ CVX/VAG CYTO-IMP: ABNORMAL

## 2020-06-01 ENCOUNTER — OFFICE VISIT (OUTPATIENT)
Dept: OBSTETRICS AND GYNECOLOGY | Age: 65
End: 2020-06-01

## 2020-06-01 VITALS
WEIGHT: 154 LBS | HEIGHT: 68 IN | DIASTOLIC BLOOD PRESSURE: 70 MMHG | SYSTOLIC BLOOD PRESSURE: 128 MMHG | BODY MASS INDEX: 23.34 KG/M2

## 2020-06-01 DIAGNOSIS — Z12.4 SCREENING FOR CERVICAL CANCER: ICD-10-CM

## 2020-06-01 DIAGNOSIS — R87.610 ATYPICAL SQUAMOUS CELLS OF UNDETERMINED SIGNIFICANCE ON CYTOLOGIC SMEAR OF CERVIX (ASC-US): ICD-10-CM

## 2020-06-01 DIAGNOSIS — Z12.39 SCREENING FOR BREAST CANCER: Primary | ICD-10-CM

## 2020-06-01 DIAGNOSIS — Z12.31 ENCOUNTER FOR SCREENING MAMMOGRAM FOR MALIGNANT NEOPLASM OF BREAST: ICD-10-CM

## 2020-06-01 DIAGNOSIS — IMO0001 IDDM (INSULIN DEPENDENT DIABETES MELLITUS): ICD-10-CM

## 2020-06-01 PROCEDURE — 99396 PREV VISIT EST AGE 40-64: CPT | Performed by: OBSTETRICS & GYNECOLOGY

## 2020-06-01 NOTE — PROGRESS NOTES
Routine Annual Visit    2020    Patient: Shy Yates          MR#:[id number]      Chief Complaint   Patient presents with   • Gynecologic Exam     last pap 19(ASCUS) HPV(+) MG 19 colonoscopy 19 dexa 2 yrs ago per pt. no complaints today        History of Present Illness    65 y.o. female  who presents for annual exam.   She now has an insulin pump, her glucose has been getting better but does not yet have the continuous glucose monitor.  She has noticed that when her glucose is elevated she has vulvar itching    Health Maintenance  Gardasil no  Last pap: , history abnormal: ASCUS HPV +  Mammogram: 2019, history abnormal: yes hx of breast bx  Colonoscopy , polyp, repeat due 5 yrs  DEXA: 2 yrs ago  PCP Dr. Richards    Current contraception: none PM    No LMP recorded (lmp unknown). Patient is postmenopausal.  Obstetric History:  OB History        5    Para   4    Term   4            AB   1    Living   4       SAB   1    TAB        Ectopic        Molar        Multiple        Live Births   4               Menstrual History:     No LMP recorded (lmp unknown). Patient is postmenopausal.       ________________________________________  Patient Active Problem List   Diagnosis   • Vulvovaginitis due to Candida   • IDDM (insulin dependent diabetes mellitus) (CMS/HCC)   • Cervical dysplasia, mild   • Insomnia   • Hypertension   • Hyperlipemia   • Depression   • Chorea   • Colon polyps   • Encounter for screening for malignant neoplasm of colon   • Vulvovaginal candidiasis       Past Medical History:   Diagnosis Date   • Abnormal mammogram of left breast    • Anemia    • Brain bleed (CMS/HCC) 2014    Had a brain bleed as a child and then again as an adult in . Neurologist, Dr Miguel A Lynn.   • Chorea    • Colon polyps 2007    Found at her 1st colonoscopy at age 52.   • Depression       2 yr ago.   • Genital warts 2018:  Extensive on  "both labia majora and minora, perineum and perianally.  Treated multiple times with podophyllin or BCA or TCA.  May 21, 2019: All have totally resolved.   • History of Papanicolaou smear of cervix     Pap smear Hx: \"Never had abnormal Paps\" before I saw her.  , neg pap (atrophic pattern, predominantly parabasal cells) , neg HR-HPV.  Dec, 2017, LGSIL, negative HR-HPV.  Attempted to contact patient about this until we were able to get her in for a colposcopy on 2018.    • History of Papanicolaou smear of cervix 2018: Colposcopy shows multiple flat raised condylomata on both labia majora and a row of micro-condyloma in between the right labia minora and majora.  Several in the anterior commissure.  Several at the anus.  Colposcopy showed white epithelium at 12 and 6:00, biopsied= ECC negative, biopsies at 6 and 12 show LGSIL.  Repeat Pap at that time= 2018, LGSIL, positive HR-HPV.   • Hyperlipemia     On Zocor for 6 months.   • Hypertension     Long Hx    • IDDM (insulin dependent diabetes mellitus) (CMS/[facility])     IDDM: After meals. Last Hgb A1c= 7.5.    • Insomnia     Started melatonin last couple of nights. Doing better.   • LGSIL on Pap smear of cervix 2018    See note of history of Pap smear,    • Menopause    • Migraine headache     No Aura. Resolved.   • Molluscum contagiosum 2018   • Pain in left hand     Carpel tunnel syndrome.   •  (vaginal birth after )        Family History   Problem Relation Age of Onset   • Stroke Father    • Diabetes type II Father    • Kidney cancer Father    • Breast cancer Sister 40   • Hypertension Maternal Grandmother    • Diabetes Maternal Grandmother    • Stroke Paternal Grandfather 90   • No Known Problems Son    • No Known Problems Mother          @ 93.   • Hypertension Maternal Grandfather    • Diabetes Maternal Grandfather    • Stroke Maternal Grandfather    • Diabetes Paternal Grandmother    • Heart failure " "Paternal Grandmother    • Rectal cancer Brother         Late 50's   • Diabetes type II Daughter         IDDM   • No Known Problems Maternal Aunt         90's   • No Known Problems Paternal Aunt         80's   • No Known Problems Maternal Aunt         90's   • No Known Problems Paternal Aunt         93 yoa   • BRCA 1/2 Neg Hx    • Colon cancer Neg Hx    • Endometrial cancer Neg Hx    • Ovarian cancer Neg Hx    • Malig Hyperthermia Neg Hx        Past Surgical History:   Procedure Laterality Date   • BREAST BIOPSY Left     X2  OPEN    • CARPAL TUNNEL RELEASE Left 2018, 2019    Carpel Tunnel Release Twice.   •  SECTION N/A     Baby Boy \"KEV\"   • COLONOSCOPY      POLYPS   • COLONOSCOPY N/A 2019    Procedure: COLONOSCOPY INTO CECUM WITH COLD SNARE POLYPECTOMY;  Surgeon: Elijah Em MD;  Location: [facility] ENDOSCOPY;  Service: Gastroenterology   • CYST REMOVAL Bilateral     Right Wrist, Left Hand.  ? Ganglion Cysts.   • D&C WITH SUCTION N/A     Spont .   • POSTPARTUM TUBAL LIGATION      Interval, 6 months after    • SKIN CANCER EXCISION Right     Jaw Area, ? Squamous Carcinoma.       Social History     Tobacco Use   Smoking Status Current Every Day Smoker   • Packs/day: 0.50   • Types: Cigarettes   Smokeless Tobacco Never Used   Tobacco Comment    Has tried chantix twice.       has a current medication list which includes the following prescription(s): accu-chek dharmesh plus, glucose blood, glucose monitor, insulin lispro, lamotrigine, lamotrigine, lisinopril, melatonin, metformin, simvastatin, venlafaxine xr, zolpidem, clotrimazole-betamethasone, fluconazole, jardiance, and liraglutide.  ________________________________________      The following portions of the patient's history were reviewed and updated as appropriate: allergies, current medications, past family history, past medical history, past social history, past surgical history and problem list.    Review of " "Systems   Constitutional: Negative for fever and unexpected weight change.   Respiratory: Negative for shortness of breath.    Cardiovascular: Negative for chest pain.   Gastrointestinal: Negative for abdominal pain, constipation and diarrhea.   Genitourinary: Negative for frequency and urgency.   Neurological: Negative for dizziness and headaches.   Hematological: Negative for adenopathy.   Psychiatric/Behavioral: Negative for dysphoric mood.       Objective   Physical Exam    /70   Ht 172.7 cm (68\")   Wt 69.9 kg (154 lb)   LMP  (LMP Unknown)   Breastfeeding No   BMI 23.42 kg/m²    BP Readings from Last 3 Encounters:   06/01/20 128/70   11/21/19 132/78   08/13/19 138/77      Wt Readings from Last 3 Encounters:   06/01/20 69.9 kg (154 lb)   11/21/19 66.9 kg (147 lb 6.4 oz)   08/13/19 64.9 kg (143 lb)         BMI: Body mass index is 23.42 kg/m².       General:   alert, appears stated age and cooperative   Neck: No thyromegaly or LAD, no carotid bruit noted   Heart:: regular rate and rhythm, S1, S2 normal, no murmur, click, rub or gallop   Lungs: normal respiratory effort and auscultation   Abdomen: soft, non-tender, without masses or organomegaly   Breast: inspection negative, no nipple discharge or bleeding, no masses or nodularity palpable   Urethra and bladder: urethral meatus normal; bladder nontender to palpation;   Vulva: normal, Bartholin's, Urethra, [place]'s normal   Vagina: normal mucosa, normal discharge   Cervix: multiparous appearance and no lesions, slight bleeding after pap   Uterus: normal size or anteverted   Adnexa: normal adnexa and no mass, fullness, tenderness       Assessment:    normal annual exam   Hx abnormal pap  Tobacco use, not ready to quit    Plan:    Plan     [x]  Mammogram request made  [x]  PAP done- last yr mild dysplasia, if again abnormal needs repeat colposcopy  []  Labs:   []  GC/Chl/TV  []  DEXA scan   []  Referral for colonoscopy:     Counseling  [x]  Nutrition  [x]  " Physical activity/regular exercise- not active recommended increased activity for heart health  [x]  Healthy weight  []  Injury prevention  [x]  Smoking cessation - not interested in quitting   []  Substance misuse/abuse  [x]  Sexual behavior  []  STD prevention  []  Contraception  []  Dental health  [x]  Mental health  []  Immunization  [x]  Encouraged SBRAFA Mohan MD  06/01/2020  16:27

## 2020-06-04 LAB
CYTOLOGIST CVX/VAG CYTO: ABNORMAL
CYTOLOGY CVX/VAG DOC CYTO: ABNORMAL
CYTOLOGY CVX/VAG DOC THIN PREP: ABNORMAL
DX ICD CODE: ABNORMAL
HIV 1 & 2 AB SER-IMP: ABNORMAL
HPV I/H RISK 1 DNA CVX QL PROBE+SIG AMP: POSITIVE
HPV16 DNA CVX QL PROBE+SIG AMP: NEGATIVE
HPV18 DNA CVX QL PROBE+SIG AMP: NEGATIVE
OTHER STN SPEC: ABNORMAL
STAT OF ADQ CVX/VAG CYTO-IMP: ABNORMAL

## 2020-06-05 NOTE — PROGRESS NOTES
Please notify that the cells on her pap looked normal but the HPV is still positive and I would recommend repeating the colposcopy this year since the HPV is still positive    Gloria Mohan MD  6/5/2020  06:11

## 2020-06-10 ENCOUNTER — OFFICE VISIT (OUTPATIENT)
Dept: OBSTETRICS AND GYNECOLOGY | Age: 65
End: 2020-06-10

## 2020-06-10 VITALS
DIASTOLIC BLOOD PRESSURE: 70 MMHG | BODY MASS INDEX: 23.49 KG/M2 | SYSTOLIC BLOOD PRESSURE: 142 MMHG | WEIGHT: 155 LBS | HEIGHT: 68 IN

## 2020-06-10 DIAGNOSIS — N87.0 CERVICAL DYSPLASIA, MILD: Primary | ICD-10-CM

## 2020-06-10 PROCEDURE — 57455 BIOPSY OF CERVIX W/SCOPE: CPT | Performed by: OBSTETRICS & GYNECOLOGY

## 2020-06-10 NOTE — PROGRESS NOTES
PROCEDURE NOTE   Shy Yates is being see for a Diagnosis of  NILM + HPV w hx of ISMAEL 1 last yr, LSIL pap.     No LMP recorded (lmp unknown). Patient is postmenopausal. Menopause yes  Pregnant no  Gardasil not UTD, tobacco use and counseled on a/w abnormal pap and cervical cancer, counseled cessation.    COLPOSCOPIC PROCEDURE    5% Acetic Acid was used to prepare the cervix for examination.  The SCJ was well-seen, there were mild AWE changes at 12 oclock as previously with Dr. Pandey and bx was taken. Examination of vagina was normal as well.    The total number of biopsies were 1.   The total  number of specimen containers were 1  The procedure was well tolerated. Instructions on vaginal rest and possibly bleeding were discussed as well as follow up for results.    Gloria Mohan MD  6/10/2020  14:53

## 2020-06-10 NOTE — PATIENT INSTRUCTIONS
1) For three (3) days after treatment, do not exercise vigorously or lift heavy objects.  2) Do not douche, use tampons or have intercourse for 3-7 days after treatment.  3) Use acetaminophen (Tylenol), ibuprofen (Advil, Motrin) or the prescription that you were given for any pain or cramping that you might have after this procedure.  4) You may shower or take a bath at any time after the procedure.    When to Call  Please call our advice/call center if you have any of these symptoms:  1) Vaginal bleeding greater than a pad an hour.  2) Vaginal bleeding persisting for more than 3 weeks.   3) Pain which is not controlled with your pain medication.   4) A temperature greater than 100.4F.  5) A yellow or green vaginal discharge with a foul odor.    Remember that cigarette smoking is known to increase the chance that cervical dysplasia (pre-cancerous changes) will progress to cervical cancer.

## 2020-06-15 LAB
DX ICD CODE: NORMAL
PATH REPORT.COMMENTS IMP SPEC: NORMAL
PATH REPORT.FINAL DX SPEC: NORMAL
PATH REPORT.GROSS SPEC: NORMAL
PATH REPORT.RELEVANT HX SPEC: NORMAL
PATH REPORT.SITE OF ORIGIN SPEC: NORMAL
PATHOLOGIST NAME: NORMAL
PAYMENT PROCEDURE: NORMAL

## 2020-06-17 NOTE — PROGRESS NOTES
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  I reviewed with her the options of repeating her Pap and colposcopy in 6 to 12 months and seeing if this continues, or she can go ahead with treatment with the LEEP procedure  She desires the LEEP procedures  She tolerates colposcopy and other exams well, plan to perform in the office    We will need to find a time for a 30 minute visit    Gloria Mohan MD  6/17/2020  18:15

## 2020-06-30 ENCOUNTER — APPOINTMENT (OUTPATIENT)
Dept: WOMENS IMAGING | Facility: HOSPITAL | Age: 65
End: 2020-06-30

## 2020-06-30 ENCOUNTER — PROCEDURE VISIT (OUTPATIENT)
Dept: OBSTETRICS AND GYNECOLOGY | Age: 65
End: 2020-06-30

## 2020-06-30 DIAGNOSIS — Z12.31 VISIT FOR SCREENING MAMMOGRAM: Primary | ICD-10-CM

## 2020-06-30 PROCEDURE — 77067 SCR MAMMO BI INCL CAD: CPT | Performed by: RADIOLOGY

## 2020-06-30 PROCEDURE — 77067 SCR MAMMO BI INCL CAD: CPT | Performed by: OBSTETRICS & GYNECOLOGY

## 2020-07-06 ENCOUNTER — TELEPHONE (OUTPATIENT)
Dept: OBSTETRICS AND GYNECOLOGY | Age: 65
End: 2020-07-06

## 2020-07-06 DIAGNOSIS — R92.1 BREAST CALCIFICATION, LEFT: Primary | ICD-10-CM

## 2020-07-06 NOTE — TELEPHONE ENCOUNTER
Called and notified her that a calcification in the left breast requires additional imaging.  Order placed for diagnostic mammogram.    Gloria Mohan MD  7/6/2020   15:04

## 2020-07-08 ENCOUNTER — TELEPHONE (OUTPATIENT)
Dept: OBSTETRICS AND GYNECOLOGY | Age: 65
End: 2020-07-08

## 2020-07-08 NOTE — TELEPHONE ENCOUNTER
Please let her know that I would [facility] because they will have the films in order to compare with what they were looking at on the screening mammogram. See if she is able to go there    Gloria Mohan MD  7/8/2020  12:04

## 2020-07-08 NOTE — TELEPHONE ENCOUNTER
(Kimberlee pt) Pt is scheduled Monday for a F/U MG and she is wanting an order to be placed for [facility] [phone] so she can schedule for there.    [phone]

## 2020-07-13 ENCOUNTER — OFFICE VISIT (OUTPATIENT)
Dept: OBSTETRICS AND GYNECOLOGY | Age: 65
End: 2020-07-13

## 2020-07-13 VITALS
HEIGHT: 68 IN | SYSTOLIC BLOOD PRESSURE: 152 MMHG | DIASTOLIC BLOOD PRESSURE: 82 MMHG | WEIGHT: 155 LBS | BODY MASS INDEX: 23.49 KG/M2

## 2020-07-13 DIAGNOSIS — N87.0 CERVICAL DYSPLASIA, MILD: ICD-10-CM

## 2020-07-13 PROCEDURE — 57460 BX OF CERVIX W/SCOPE LEEP: CPT | Performed by: OBSTETRICS & GYNECOLOGY

## 2020-07-13 NOTE — PROGRESS NOTES
PROCEDURE NOTE     Colposcopy and LEEP    Shy Yates is being see for a Diagnosis of persistent ISMAEL 1    No LMP recorded (lmp unknown). Patient is postmenopausal. Menopause yes  Pregnant no  Gardasil not up to date    5% Acetic Acid was used to prepare the cervix for examination. There was mild AWE at 12 o clock.  An intracervical block was performed with 9 mL of 1% lidocaine with epinephrine.  A 1.5 x 2.0 centimeter loop was used to perform the LEEP procedure, incorporating the entirety of the squamocolumnar junction.  An additional, endocervical LEEP was performed with a 1 x 1 cm loop.  The LEEP bed was cauterized and Monsel solution was applied.    The total  number of specimen containers were 2  The procedure was well tolerated. Instructions on vaginal rest and possibly bleeding were discussed as well as follow up for results.      Gloria Mohan MD  7/13/2020  14:30

## 2020-07-13 NOTE — PATIENT INSTRUCTIONS
Cervical Conization, Care After  This sheet gives you information about how to care for yourself after your procedure. Your doctor may also give you more specific instructions. If you have problems or questions, contact your doctor.  Follow these instructions at home:  Medicines    · Take over-the-counter and prescription medicines only as told by your doctor.  · Do not take aspirin until your doctor says it is okay.  · If you take pain medicine:  ? You may have constipation. To help treat this, your doctor may tell you to:  § Drink enough fluid to keep your pee (urine) clear or pale yellow.  § Take medicines.  § Eat foods that are high in fiber. These include fresh fruits and vegetables, whole grains, bran, and beans.  § Limit foods that are high in fat and sugar. These include fried foods and sweet foods.  ? Do not drive or use heavy machines.  General instructions  · You can eat your usual diet unless your doctor tells you not to do so.  · Take showers for the first week. Do not take baths, swim, or use hot tubs until your doctor says it is okay.  · Do not douche, use tampons, or have sex until your doctor says it is okay.  · For 7-14 days after your procedure, avoid:  ? Being very active.  ? Exercising.  ? Heavy lifting.  · Keep all follow-up visits as told by your doctor. This is important.  Contact a doctor if:  · You have a rash.  · You are dizzy or lightheaded.  · You feel sick to your stomach (nauseous).  · You throw up (vomit).  · You have fluid from your vagina (vaginal discharge) that smells bad.  Get help right away if:  · There are blood clots coming from your vagina.  · You have more bleeding than you would have in a normal period. For example, you soak a pad in less than 1 hour.  · You have a fever.  · You have more and more cramps.  · You pass out (faint).  · You have pain when peeing.  · Your have a lot of pain.  · Your pain gets worse.  · Your pain does not get better when you take your  medicine.  · You have blood in your pee.  · You throw up (vomit).  Summary  · After your procedure, take over-the-counter and prescription medicines only as told by your doctor.  · Do not douche, use tampons, or have sex until your doctor says it is okay.  · For about 7-14 days after your procedure, try not to exercise or lift heavy objects.  · Get help right away if you have new symptoms, or if your symptoms become worse.  This information is not intended to replace advice given to you by your health care provider. Make sure you discuss any questions you have with your health care provider.  Document Released: 09/26/2009 Document Revised: 11/30/2018 Document Reviewed: 12/20/2017  Elsevier Patient Education © 2020 [employer]

## 2020-07-14 ENCOUNTER — HOSPITAL ENCOUNTER (OUTPATIENT)
Dept: MAMMOGRAPHY | Facility: HOSPITAL | Age: 65
Discharge: HOME | End: 2020-07-14
Admitting: OBSTETRICS & GYNECOLOGY

## 2020-07-14 DIAGNOSIS — R92.1 BREAST CALCIFICATION, LEFT: ICD-10-CM

## 2020-07-14 PROCEDURE — 77065 DX MAMMO INCL CAD UNI: CPT

## 2020-07-15 LAB
DX ICD CODE: NORMAL
PATH REPORT.FINAL DX SPEC: NORMAL
PATH REPORT.GROSS SPEC: NORMAL
PATH REPORT.SITE OF ORIGIN SPEC: NORMAL
PATHOLOGIST NAME: NORMAL
PAYMENT PROCEDURE: NORMAL

## 2020-07-15 NOTE — PROGRESS NOTES
Call notified that her LEEP had only low-grade changes.  There was some at the margin, and would recommend repeating a Pap in 6 months and 12 months.  Also her mammogram was benign    Gloria Mohan MD  7/15/2020  17:06

## 2020-08-19 ENCOUNTER — OFFICE VISIT (OUTPATIENT)
Dept: OBSTETRICS AND GYNECOLOGY | Age: 65
End: 2020-08-19

## 2020-08-19 VITALS
SYSTOLIC BLOOD PRESSURE: 128 MMHG | DIASTOLIC BLOOD PRESSURE: 74 MMHG | WEIGHT: 153 LBS | BODY MASS INDEX: 23.19 KG/M2 | HEIGHT: 68 IN

## 2020-08-19 DIAGNOSIS — N87.0 CERVICAL DYSPLASIA, MILD: Primary | ICD-10-CM

## 2020-08-19 PROCEDURE — 99024 POSTOP FOLLOW-UP VISIT: CPT | Performed by: OBSTETRICS & GYNECOLOGY

## 2020-08-19 NOTE — PROGRESS NOTES
"Chief Complaint   Patient presents with   • Gynecologic Exam     Gyn follow up: Leep     Shy Yates is here for follow-up after LEEP procedure.  She notes that she had no cramping, no bleeding, no abnormal discharge.    She is doing well without complaints.    /74   Ht 172.7 cm (68\")   Wt 69.4 kg (153 lb)   LMP  (LMP Unknown)   Breastfeeding No   BMI 23.26 kg/m²   Well, no distress  Regular, nonlabored breathing  Pelvic exam with normal-appearing cervix, slight sequelae of LEEP but well-healed    LOW-GRADE SQUAMOUS INTRAEPITHELIAL LESION (ISMAEL 1). CHRONIC   CERVICITIS. THE DYSPLASTIC PROCESS EXTENDS TO THE ENDOCERVICAL MARGIN   OF EXCISION. THE DYSPLASTIC PROCESS DOES NOT EXTEND TO THE EXOCERVICAL   MARGIN OF EXCISION.   Part B: ENDOCERVICAL CURETTAGE:   LOW-GRADE SQUAMOUS INTRAEPITHELIAL LESION (ISMAEL 1). CHRONIC CERVICITIS.     A/P    ISMAEL 1    Extends to endocervical margin, plan to repeat pap in 6 and 12 months    Gloria Mohan MD  8/19/2020  13:11    "

## 2021-02-22 ENCOUNTER — OFFICE VISIT (OUTPATIENT)
Dept: OBSTETRICS AND GYNECOLOGY | Age: 66
End: 2021-02-22

## 2021-02-22 VITALS
SYSTOLIC BLOOD PRESSURE: 150 MMHG | HEIGHT: 68 IN | DIASTOLIC BLOOD PRESSURE: 78 MMHG | WEIGHT: 160.4 LBS | BODY MASS INDEX: 24.31 KG/M2

## 2021-02-22 DIAGNOSIS — N87.0 DYSPLASIA OF CERVIX, LOW GRADE (CIN 1): Primary | ICD-10-CM

## 2021-02-22 DIAGNOSIS — Z12.4 SCREENING FOR MALIGNANT NEOPLASM OF THE CERVIX: ICD-10-CM

## 2021-02-22 PROCEDURE — 99213 OFFICE O/P EST LOW 20 MIN: CPT | Performed by: OBSTETRICS & GYNECOLOGY

## 2021-02-22 RX ORDER — ESCITALOPRAM OXALATE 20 MG/1
TABLET ORAL
COMMUNITY
Start: 2021-02-12

## 2021-02-22 NOTE — PROGRESS NOTES
"Chief Complaint   Patient presents with   • Follow-up     GYN f/u repeat PAP ASCUS 11/21/19 + HPV, LEEP 11/13/20 LOW GRADE CHANGES     Shy Yates is doing well, no complaints today.  She had abnormal Pap last year, persistent ISMAEL-1 and had LEEP with positive endocervical margin for ISMAEL-1.  She returns for repeat Pap today.  Needs ambien states usually gets from GP  She does continue to smoke    /78   Ht 172.7 cm (68\")   Wt 72.8 kg (160 lb 6.4 oz)   LMP  (LMP Unknown)   Breastfeeding No   BMI 24.39 kg/m²   Well and in no distress today  Regular, nonlabored breathing  Pelvic exam with normal-appearing vulva, no lesions.  Normal but atrophic vagina  Post LEEP appearance of cervix, no stenosis      A/P  Persistent ISMAEL-1    CIN1 w pos margin on LEEP last year, repeat pap today    Discussed I don't prescribe ambien, to discuss with PCP    Follow-up for annual exam in approximately 6 months    Gloria Mohan MD  2/22/2021  16:08 EST    "

## 2021-02-25 LAB
CYTOLOGIST CVX/VAG CYTO: ABNORMAL
CYTOLOGY CVX/VAG DOC CYTO: ABNORMAL
CYTOLOGY CVX/VAG DOC THIN PREP: ABNORMAL
DX ICD CODE: ABNORMAL
DX ICD CODE: ABNORMAL
HIV 1 & 2 AB SER-IMP: ABNORMAL
HPV I/H RISK 4 DNA CVX QL PROBE+SIG AMP: NEGATIVE
OTHER STN SPEC: ABNORMAL
PATHOLOGIST CVX/VAG CYTO: ABNORMAL
RECOM F/U CVX/VAG CYTO: ABNORMAL
STAT OF ADQ CVX/VAG CYTO-IMP: ABNORMAL

## 2021-02-25 NOTE — PROGRESS NOTES
Please notify her that they were just some slightly atypical cells on her Pap.  The HPV test was negative.  I would recommend coming in in 6 months for a repeat Pap, it looks like she already has an appointment in August.    Gloria Mohan MD  2/25/2021  17:25 EST

## 2021-08-18 ENCOUNTER — OFFICE VISIT (OUTPATIENT)
Dept: OBSTETRICS AND GYNECOLOGY | Age: 66
End: 2021-08-18

## 2021-08-18 ENCOUNTER — APPOINTMENT (OUTPATIENT)
Dept: WOMENS IMAGING | Facility: HOSPITAL | Age: 66
End: 2021-08-18

## 2021-08-18 ENCOUNTER — PROCEDURE VISIT (OUTPATIENT)
Dept: OBSTETRICS AND GYNECOLOGY | Age: 66
End: 2021-08-18

## 2021-08-18 VITALS
WEIGHT: 156.8 LBS | SYSTOLIC BLOOD PRESSURE: 134 MMHG | BODY MASS INDEX: 23.76 KG/M2 | HEIGHT: 68 IN | DIASTOLIC BLOOD PRESSURE: 80 MMHG

## 2021-08-18 DIAGNOSIS — Z01.419 ENCOUNTER FOR GYNECOLOGICAL EXAMINATION WITHOUT ABNORMAL FINDING: ICD-10-CM

## 2021-08-18 DIAGNOSIS — N87.0 CERVICAL DYSPLASIA, MILD: ICD-10-CM

## 2021-08-18 DIAGNOSIS — Z11.51 SCREENING FOR HUMAN PAPILLOMAVIRUS: Primary | ICD-10-CM

## 2021-08-18 DIAGNOSIS — Z12.31 VISIT FOR SCREENING MAMMOGRAM: Primary | ICD-10-CM

## 2021-08-18 PROCEDURE — 77063 BREAST TOMOSYNTHESIS BI: CPT | Performed by: RADIOLOGY

## 2021-08-18 PROCEDURE — 99396 PREV VISIT EST AGE 40-64: CPT | Performed by: OBSTETRICS & GYNECOLOGY

## 2021-08-18 PROCEDURE — 77067 SCR MAMMO BI INCL CAD: CPT | Performed by: OBSTETRICS & GYNECOLOGY

## 2021-08-18 PROCEDURE — 77067 SCR MAMMO BI INCL CAD: CPT | Performed by: RADIOLOGY

## 2021-08-18 PROCEDURE — 77063 BREAST TOMOSYNTHESIS BI: CPT | Performed by: OBSTETRICS & GYNECOLOGY

## 2021-08-18 RX ORDER — INSULIN PUMP CONTROLLER
EACH MISCELLANEOUS
COMMUNITY
Start: 2021-07-24

## 2021-08-18 RX ORDER — HYDROCODONE BITARTRATE AND ACETAMINOPHEN 7.5; 325 MG/1; MG/1
1 TABLET ORAL 4 TIMES DAILY
COMMUNITY
Start: 2021-06-01 | End: 2021-08-18

## 2021-08-18 RX ORDER — LISINOPRIL AND HYDROCHLOROTHIAZIDE 20; 12.5 MG/1; MG/1
TABLET ORAL
COMMUNITY
Start: 2021-08-17

## 2021-08-18 NOTE — PROGRESS NOTES
Routine Annual Visit    2021    Patient: Shy Yates          MR#:[id number]      Chief Complaint   Patient presents with   • Gynecologic Exam     AE Today, Last AE last pap 2021 ASCUS HPV(-), MG 2020, MG scheduled today, Colonoscopy 2019       History of Present Illness    66 y.o. female  who presents for annual exam.   She recently had terrible diarrhea and had stool cultures, had an antibiotic and cleared it up    She had LEEP procedure 2020 for persistent ISMAEL-1.    Has mammogram scheduled today. Up-to-date with her colonoscopy    No other complaints today    Has not yet gotten the Covid vaccine, concerned about the use of stem cells or fetal tissue in the making of some vaccines    No LMP recorded (lmp unknown). Patient is postmenopausal.  Obstetric History:  OB History        5    Para   4    Term   4            AB   1    Living   4       SAB   1    TAB        Ectopic        Molar        Multiple        Live Births   4               Menstrual History:     No LMP recorded (lmp unknown). Patient is postmenopausal.       ________________________________________  Patient Active Problem List   Diagnosis   • Vulvovaginitis due to Candida   • IDDM (insulin dependent diabetes mellitus)   • Cervical dysplasia, mild   • Insomnia   • Hypertension   • Hyperlipemia   • Depression   • Chorea   • Colon polyps   • Encounter for screening for malignant neoplasm of colon   • Vulvovaginal candidiasis       Past Medical History:   Diagnosis Date   • Abnormal mammogram of left breast    • Anemia    • Anxiety    • Brain bleed (CMS/[facility]) 2014    Had a brain bleed as a child and then again as an adult in . Neurologist, Dr Miguel A Lynn.   • Chorea    • Colon polyps 2007    Found at her 1st colonoscopy at age 52.   • Depression       2 yr ago.   • Genital warts 2018:  Extensive on both labia majora and minora, perineum and perianally.  Treated multiple  "times with podophyllin or BCA or TCA.  May 21, 2019: All have totally resolved.   • History of Papanicolaou smear of cervix     Pap smear Hx: \"Never had abnormal Paps\" before I saw her.  , neg pap (atrophic pattern, predominantly parabasal cells) , neg HR-HPV.  Dec, 2017, LGSIL, negative HR-HPV.  Attempted to contact patient about this until we were able to get her in for a colposcopy on 2018.    • History of Papanicolaou smear of cervix 2018: Colposcopy shows multiple flat raised condylomata on both labia majora and a row of micro-condyloma in between the right labia minora and majora.  Several in the anterior commissure.  Several at the anus.  Colposcopy showed white epithelium at 12 and 6:00, biopsied= ECC negative, biopsies at 6 and 12 show LGSIL.  Repeat Pap at that time= 2018, LGSIL, positive HR-HPV.   • Hyperlipemia     On Zocor for 6 months.   • Hypertension     Long Hx    • IDDM (insulin dependent diabetes mellitus)     IDDM: After meals. Last Hgb A1c= 7.5.    • Insomnia     Started melatonin last couple of nights. Doing better.   • LGSIL on Pap smear of cervix 2018    See note of history of Pap smear, 2018   • Menopause    • Migraine headache     No Aura. Resolved.   • Molluscum contagiosum 2018   • Pain in left hand     Carpel tunnel syndrome.   •  (vaginal birth after )        Family History   Problem Relation Age of Onset   • Stroke Father    • Diabetes type II Father    • Kidney cancer Father    • Breast cancer Sister 40   • Hypertension Maternal Grandmother    • Diabetes Maternal Grandmother    • Stroke Paternal Grandfather 90   • No Known Problems Son    • No Known Problems Mother          @ 93.   • Hypertension Maternal Grandfather    • Diabetes Maternal Grandfather    • Stroke Maternal Grandfather    • Diabetes Paternal Grandmother    • Heart failure Paternal Grandmother    • Rectal cancer Brother         Late 50's   • Diabetes type " "II Daughter         IDDM   • No Known Problems Maternal Aunt         90's   • No Known Problems Paternal Aunt         80's   • No Known Problems Maternal Aunt         90's   • No Known Problems Paternal Aunt         93 yoa   • BRCA 1/2 Neg Hx    • Colon cancer Neg Hx    • Endometrial cancer Neg Hx    • Ovarian cancer Neg Hx    • Malig Hyperthermia Neg Hx        Past Surgical History:   Procedure Laterality Date   • BREAST BIOPSY Left     X2  OPEN    • CARPAL TUNNEL RELEASE Left 2018, 2019    Carpel Tunnel Release Twice.   •  SECTION N/A     Baby Boy \"KEV\"   • COLONOSCOPY      POLYPS   • COLONOSCOPY N/A 2019    Procedure: COLONOSCOPY INTO CECUM WITH COLD SNARE POLYPECTOMY;  Surgeon: Elijah Em MD;  Location: [facility] ENDOSCOPY;  Service: Gastroenterology   • CYST REMOVAL Bilateral     Right Wrist, Left Hand.  ? Ganglion Cysts.   • D & C WITH SUCTION N/A     Spont .   • POSTPARTUM TUBAL LIGATION      Interval, 6 months after    • SKIN CANCER EXCISION Right     Jaw Area, ? Squamous Carcinoma.       Social History     Tobacco Use   Smoking Status Current Every Day Smoker   • Packs/day: 0.50   • Types: Cigarettes   Smokeless Tobacco Never Used   Tobacco Comment    Has tried chantix twice.       has a current medication list which includes the following prescription(s): accu-chek dharmesh plus, escitalopram, glucose blood, glucose monitor, omnipod dash 5 pack pods, insulin lispro, lamotrigine, lisinopril-hydrochlorothiazide, melatonin, metformin, simvastatin, venlafaxine xr, zolpidem, clotrimazole-betamethasone, fluconazole, hydrocodone-acetaminophen, jardiance, lamotrigine, liraglutide, and lisinopril.  ________________________________________      Review of Systems   Constitutional: Negative for fever and unexpected weight change.   Respiratory: Negative for shortness of breath.    Cardiovascular: Negative for chest pain.   Gastrointestinal: Negative for abdominal pain, " "constipation and diarrhea.   Genitourinary: Negative for frequency and urgency.   Hematological: Negative for adenopathy.   Psychiatric/Behavioral: Negative for dysphoric mood.       Objective   Physical Exam    /80   Ht 172.7 cm (68\")   Wt 71.1 kg (156 lb 12.8 oz)   LMP  (LMP Unknown)   Breastfeeding No   BMI 23.84 kg/m²    BP Readings from Last 3 Encounters:   08/18/21 134/80   02/22/21 150/78   08/19/20 128/74      Wt Readings from Last 3 Encounters:   08/18/21 71.1 kg (156 lb 12.8 oz)   02/22/21 72.8 kg (160 lb 6.4 oz)   08/19/20 69.4 kg (153 lb)         BMI: Body mass index is 23.84 kg/m².       General:   alert, appears stated age and cooperative   Neck: No thyromegaly or LAD, no carotid bruit noted   Heart:: regular rate and rhythm, S1, S2 normal, no murmur, click, rub or gallop   Lungs: normal respiratory effort and auscultation   Abdomen: soft, non-tender, without masses or organomegaly   Breast: inspection negative, no nipple discharge or bleeding, no masses or nodularity palpable   Urethra and bladder: urethral meatus normal; bladder nontender to palpation;   Vulva: normal, Bartholin's, Urethra, [place]'s normal   Vagina: Normal but atrophic mucosa, no lesions   Cervix: multiparous appearance and no lesions   Uterus: normal size and anteverted   Adnexa: normal adnexa and no mass, fullness, tenderness       Assessment:    normal annual exam   Recurrent ISMAEL 1    Plan:    Plan     [x]  Mammogram request made  [x]  PAP done  []  Labs:   []  GC/Chl/TV  []  DEXA scan   []  Referral for colonoscopy:       Counseling  [x]  Nutrition  [x]  Physical activity/regular exercise   [x]  Healthy weight  []  Injury prevention  []  Smoking cessation  []  Substance misuse/abuse  []  Sexual behavior  []  STD prevention  []  Contraception  []  Dental health  []  Mental health  [x]  Immunization-looked up information and reviewed that only the [employer] vaccine may use stem cells or fetal tissue in " manufacture, recommend getting the Covid vaccine.  [x]  Encouraged SBRAFA Mohan MD  08/18/2021  14:12 EDT

## 2021-08-20 LAB
CYTOLOGIST CVX/VAG CYTO: NORMAL
CYTOLOGY CVX/VAG DOC CYTO: NORMAL
CYTOLOGY CVX/VAG DOC THIN PREP: NORMAL
DX ICD CODE: NORMAL
HIV 1 & 2 AB SER-IMP: NORMAL
HPV I/H RISK 4 DNA CVX QL PROBE+SIG AMP: NEGATIVE
OTHER STN SPEC: NORMAL
STAT OF ADQ CVX/VAG CYTO-IMP: NORMAL

## 2021-08-23 ENCOUNTER — TELEPHONE (OUTPATIENT)
Dept: OBSTETRICS AND GYNECOLOGY | Age: 66
End: 2021-08-23

## 2022-02-28 ENCOUNTER — OFFICE VISIT (OUTPATIENT)
Dept: OBSTETRICS AND GYNECOLOGY | Age: 67
End: 2022-02-28

## 2022-02-28 VITALS
BODY MASS INDEX: 22.85 KG/M2 | WEIGHT: 150.8 LBS | HEIGHT: 68 IN | DIASTOLIC BLOOD PRESSURE: 74 MMHG | SYSTOLIC BLOOD PRESSURE: 126 MMHG

## 2022-02-28 DIAGNOSIS — Z11.51 SCREENING FOR HUMAN PAPILLOMAVIRUS: ICD-10-CM

## 2022-02-28 DIAGNOSIS — N87.0 CERVICAL DYSPLASIA, MILD: Primary | ICD-10-CM

## 2022-02-28 PROBLEM — B37.31 VULVOVAGINAL CANDIDIASIS: Status: RESOLVED | Noted: 2019-11-21 | Resolved: 2022-02-28

## 2022-02-28 PROCEDURE — 99213 OFFICE O/P EST LOW 20 MIN: CPT | Performed by: OBSTETRICS & GYNECOLOGY

## 2022-02-28 RX ORDER — VENLAFAXINE HYDROCHLORIDE 75 MG/1
TABLET, EXTENDED RELEASE ORAL
COMMUNITY
Start: 2022-02-22 | End: 2022-08-29

## 2022-02-28 RX ORDER — DAPAGLIFLOZIN 10 MG/1
TABLET, FILM COATED ORAL
COMMUNITY
Start: 2021-11-01 | End: 2022-08-29

## 2022-02-28 RX ORDER — TOPIRAMATE 25 MG/1
25 TABLET ORAL
COMMUNITY
Start: 2021-10-01

## 2022-02-28 NOTE — PROGRESS NOTES
"Subjective     Chief Complaint   Patient presents with   • Gynecologic Exam     6 month follow up pap        Shy Yates is a 67 y.o.  whose LMP is No LMP recorded (lmp unknown). Patient is postmenopausal. presents for repeat pap. Hx of LEEP for persistent ISMAEL 1, + endocervical margin    No Additional Complaints Reported    The following portions of the patient's history were reviewed and updated as appropriate:vital signs, allergies, current medications, past medical history, past social history, past surgical history and problem list    Objective      /74   Ht 172.7 cm (68\")   Wt 68.4 kg (150 lb 12.8 oz)   LMP  (LMP Unknown)   Breastfeeding No   BMI 22.93 kg/m²     Physical Exam   Well no distress  Normal-appearing vulva, a little extra white discharge but she is asymptomatic.  Cervix is a little bit flush with the vagina, no stenosis.  Pap obtained.      Assessment/Plan     Diagnoses and all orders for this visit:    1. Cervical dysplasia, mild (Primary)      Repeat Pap today, last one was normal.  Had ISMAEL-1 that extended to the endocervical margin, but as it was only low-grade dysplasia plan to monitor Paps.  Follow-up for annual end of August/beginning of September      Gloria Mohan MD  2022             "

## 2022-07-01 ENCOUNTER — TRANSCRIBE ORDERS (OUTPATIENT)
Dept: ADMINISTRATIVE | Facility: HOSPITAL | Age: 67
End: 2022-07-01

## 2022-07-01 ENCOUNTER — LAB (OUTPATIENT)
Dept: LAB | Facility: HOSPITAL | Age: 67
End: 2022-07-01

## 2022-07-01 DIAGNOSIS — E11.9 DIABETES MELLITUS WITHOUT COMPLICATION: ICD-10-CM

## 2022-07-01 DIAGNOSIS — E03.9 HYPOTHYROIDISM, ADULT: ICD-10-CM

## 2022-07-01 DIAGNOSIS — E03.9 HYPOTHYROIDISM, ADULT: Primary | ICD-10-CM

## 2022-07-01 PROCEDURE — 36415 COLL VENOUS BLD VENIPUNCTURE: CPT

## 2022-07-01 PROCEDURE — 83036 HEMOGLOBIN GLYCOSYLATED A1C: CPT

## 2022-07-01 PROCEDURE — 84443 ASSAY THYROID STIM HORMONE: CPT

## 2022-07-02 LAB
HBA1C MFR BLD: 7.8 % (ref 4.8–5.6)
TSH SERPL DL<=0.05 MIU/L-ACNC: 1.24 UIU/ML (ref 0.27–4.2)

## 2022-08-01 ENCOUNTER — TELEPHONE (OUTPATIENT)
Dept: OBSTETRICS AND GYNECOLOGY | Age: 67
End: 2022-08-01

## 2022-08-01 DIAGNOSIS — Z12.31 ENCOUNTER FOR SCREENING MAMMOGRAM FOR MALIGNANT NEOPLASM OF BREAST: Primary | ICD-10-CM

## 2022-08-01 NOTE — TELEPHONE ENCOUNTER
Pt called asking if she can get a mammogram order so she can have her mammogram done at [facility] instead of our office as that office is more convenient for her. Please advise.

## 2022-08-24 ENCOUNTER — TRANSCRIBE ORDERS (OUTPATIENT)
Dept: ADMINISTRATIVE | Facility: HOSPITAL | Age: 67
End: 2022-08-24

## 2022-08-24 DIAGNOSIS — Z78.0 POST-MENOPAUSAL: Primary | ICD-10-CM

## 2022-08-26 ENCOUNTER — HOSPITAL ENCOUNTER (OUTPATIENT)
Dept: MAMMOGRAPHY | Facility: HOSPITAL | Age: 67
Discharge: HOME | End: 2022-08-26
Admitting: OBSTETRICS & GYNECOLOGY

## 2022-08-26 ENCOUNTER — TELEPHONE (OUTPATIENT)
Dept: OBSTETRICS AND GYNECOLOGY | Age: 67
End: 2022-08-26

## 2022-08-26 DIAGNOSIS — Z12.31 ENCOUNTER FOR SCREENING MAMMOGRAM FOR MALIGNANT NEOPLASM OF BREAST: ICD-10-CM

## 2022-08-26 PROCEDURE — 77067 SCR MAMMO BI INCL CAD: CPT

## 2022-08-26 NOTE — TELEPHONE ENCOUNTER
Provider: DR MONTGOMERY  Caller: CEDRIC WITH SAYDA GUAN  Relationship to Patient: PROVIDER   Phone Number: [phone]  Reason for Call: NEED TO HAVE LAST MAMMO REPORT AND IMAGES SENT OVER.  When was the patient last seen: 6/30/2020    PLEASE FAX TO # [phone], CEDRIC WILL ALSO FAX IN A REQUEST TO RELEASE THE MAMMO IMAGES.

## 2022-08-29 ENCOUNTER — OFFICE VISIT (OUTPATIENT)
Dept: OBSTETRICS AND GYNECOLOGY | Age: 67
End: 2022-08-29

## 2022-08-29 VITALS
HEIGHT: 68 IN | WEIGHT: 153 LBS | DIASTOLIC BLOOD PRESSURE: 72 MMHG | BODY MASS INDEX: 23.19 KG/M2 | SYSTOLIC BLOOD PRESSURE: 134 MMHG

## 2022-08-29 DIAGNOSIS — N87.0 CERVICAL DYSPLASIA, MILD: ICD-10-CM

## 2022-08-29 DIAGNOSIS — Z01.419 ENCOUNTER FOR GYNECOLOGICAL EXAMINATION WITHOUT ABNORMAL FINDING: Primary | ICD-10-CM

## 2022-08-29 DIAGNOSIS — Z91.89: ICD-10-CM

## 2022-08-29 DIAGNOSIS — Z12.4 SCREENING FOR CERVICAL CANCER: ICD-10-CM

## 2022-08-29 DIAGNOSIS — R87.610 ATYPICAL SQUAMOUS CELL OF UNDETERMINED SIGNIFICANCE OF CERVIX: ICD-10-CM

## 2022-08-29 PROCEDURE — 99397 PER PM REEVAL EST PAT 65+ YR: CPT | Performed by: OBSTETRICS & GYNECOLOGY

## 2022-08-29 RX ORDER — FENOFIBRATE 145 MG/1
145 TABLET, COATED ORAL DAILY
COMMUNITY
Start: 2022-08-05

## 2022-08-29 RX ORDER — CITALOPRAM 10 MG/1
10 TABLET ORAL DAILY
COMMUNITY
Start: 2022-08-24

## 2022-08-29 RX ORDER — TEMAZEPAM 15 MG/1
15 CAPSULE ORAL
COMMUNITY
Start: 2022-07-15

## 2022-08-29 RX ORDER — INSULIN LISPRO 100 [IU]/ML
INJECTION, SOLUTION INTRAVENOUS; SUBCUTANEOUS
COMMUNITY
Start: 2022-08-07

## 2022-08-29 RX ORDER — HYDROXYZINE PAMOATE 25 MG/1
CAPSULE ORAL
COMMUNITY
Start: 2022-08-24

## 2022-08-29 RX ORDER — DAPAGLIFLOZIN AND METFORMIN HYDROCHLORIDE 5; 1000 MG/1; MG/1
2 TABLET, FILM COATED, EXTENDED RELEASE ORAL DAILY
COMMUNITY
Start: 2022-08-07

## 2022-08-29 NOTE — PROGRESS NOTES
"Routine Annual Visit    2022    Patient: Shy Yates          MR#:[id number]      Chief Complaint   Patient presents with   • Gynecologic Exam     AE Today, Last AE 2021 (-), HPV(-), MG 2022, Dexa scheduled this week, Colonoscopy 2019       History of Present Illness    67 y.o. female  who presents for annual exam.   She has a history of cervical dysplasia and tobacco use, needs continued testing for HPV  Not SA  No urinary complaints  Considering going to [place] to be near her grandchildren    No LMP recorded (lmp unknown). Patient is postmenopausal.  Obstetric History:  OB History        5    Para   4    Term   4            AB   1    Living   4       SAB   1    IAB        Ectopic        Molar        Multiple        Live Births   4               Menstrual History:     No LMP recorded (lmp unknown). Patient is postmenopausal.       ________________________________________  Patient Active Problem List   Diagnosis   • Vulvovaginitis due to Candida   • IDDM (insulin dependent diabetes mellitus)   • Cervical dysplasia, mild   • Insomnia   • Hypertension   • Hyperlipemia   • Depression   • Chorea   • Colon polyps   • Encounter for screening for malignant neoplasm of colon       Past Medical History:   Diagnosis Date   • Abnormal mammogram of left breast    • Anemia    • Anxiety    • Brain bleed (HCC)     Had a brain bleed as a child and then again as an adult in . Neurologist, Dr Miguel A Lynn.   • Chorea    • Colon polyps 2007    Found at her 1st colonoscopy at age 52.   • Depression       2 yr ago.   • Genital warts 2018:  Extensive on both labia majora and minora, perineum and perianally.  Treated multiple times with podophyllin or BCA or TCA.  May 21, 2019: All have totally resolved.   • History of Papanicolaou smear of cervix     Pap smear Hx: \"Never had abnormal Paps\" before I saw her.  2015, neg pap (atrophic pattern, " predominantly parabasal cells) , neg HR-HPV.  Dec, 2017, LGSIL, negative HR-HPV.  Attempted to contact patient about this until we were able to get her in for a colposcopy on 2018.    • History of Papanicolaou smear of cervix 2018: Colposcopy shows multiple flat raised condylomata on both labia majora and a row of micro-condyloma in between the right labia minora and majora.  Several in the anterior commissure.  Several at the anus.  Colposcopy showed white epithelium at 12 and 6:00, biopsied= ECC negative, biopsies at 6 and 12 show LGSIL.  Repeat Pap at that time= 2018, LGSIL, positive HR-HPV.   • HPV (human papilloma virus) infection    • Hyperlipemia     On Zocor for 6 months.   • Hypertension     Long Hx    • IDDM (insulin dependent diabetes mellitus)     IDDM: After meals. Last Hgb A1c= 7.5.    • Insomnia     Started melatonin last couple of nights. Doing better.   • LGSIL on Pap smear of cervix 2018    See note of history of Pap smear, 2018   • Menopause    • Migraine headache     No Aura. Resolved.   • Molluscum contagiosum 2018   • Pain in left hand     Carpel tunnel syndrome.   • Spinal headache     After    • Varicella     Chicken pox as a child   •  (vaginal birth after )        Family History   Problem Relation Age of Onset   • Stroke Father    • Diabetes type II Father    • Kidney cancer Father    • Diabetes Father    • No Known Problems Mother          @ 93.   • Rectal cancer Brother         Late 50's   • Breast cancer Sister 40   • No Known Problems Son    • Diabetes type II Daughter         IDDM   • Stroke Paternal Grandfather 90   • Diabetes Paternal Grandmother    • Heart failure Paternal Grandmother    • Hypertension Maternal Grandmother    • Diabetes Maternal Grandmother    • Hypertension Maternal Grandfather    • Diabetes Maternal Grandfather    • Stroke Maternal Grandfather    • No Known Problems Maternal Aunt          "90's   • No Known Problems Maternal Aunt         90's   • No Known Problems Paternal Aunt         80's   • No Known Problems Paternal Aunt         93 yoa   • BRCA 1/2 Neg Hx    • Colon cancer Neg Hx    • Endometrial cancer Neg Hx    • Ovarian cancer Neg Hx    • Malig Hyperthermia Neg Hx        Past Surgical History:   Procedure Laterality Date   • BREAST BIOPSY Left     X2  OPEN    • CARPAL TUNNEL RELEASE Left 2018, 2019    Carpel Tunnel Release Twice.   •  SECTION N/A     Baby Boy \"KEV\"   • COLONOSCOPY      POLYPS   • COLONOSCOPY N/A 2019    Procedure: COLONOSCOPY INTO CECUM WITH COLD SNARE POLYPECTOMY;  Surgeon: Elijah Em MD;  Location: [facility] ENDOSCOPY;  Service: Gastroenterology   • CYST REMOVAL Bilateral     Right Wrist, Left Hand.  ? Ganglion Cysts.   • D & C WITH SUCTION N/A     Spont .   • POSTPARTUM TUBAL LIGATION      Interval, 6 months after    • SKIN CANCER EXCISION Right     Jaw Area, ? Squamous Carcinoma.   • TUBAL ABDOMINAL LIGATION     • WISDOM TOOTH EXTRACTION         Social History     Tobacco Use   Smoking Status Current Some Day Smoker   • Packs/day: 0.25   • Years: 5.00   • Pack years: 1.25   • Types: Cigarettes   Smokeless Tobacco Never Used   Tobacco Comment    I never smoked while pregnant       has a current medication list which includes the following prescription(s): accu-chek dharmesh plus, citalopram, escitalopram, fenofibrate, glucose blood, glucose monitor, humalog, hydroxyzine pamoate, omnipod dash pods (gen 4), lamotrigine, lisinopril-hydrochlorothiazide, simvastatin, temazepam, topiramate, xigduo xr, and zolpidem.  ________________________________________      Review of Systems   Constitutional: Negative for fever and unexpected weight change.   Respiratory: Negative for shortness of breath.    Cardiovascular: Negative for chest pain.   Gastrointestinal: Negative for abdominal pain, constipation and diarrhea. " "  Genitourinary: Negative for frequency and urgency.   Hematological: Negative for adenopathy.   Psychiatric/Behavioral: Negative for dysphoric mood.       Objective   Physical Exam    /72   Ht 172.7 cm (68\")   Wt 69.4 kg (153 lb)   LMP  (LMP Unknown)   Breastfeeding No   BMI 23.26 kg/m²    BP Readings from Last 3 Encounters:   08/29/22 134/72   02/28/22 126/74   08/18/21 134/80      Wt Readings from Last 3 Encounters:   08/29/22 69.4 kg (153 lb)   02/28/22 68.4 kg (150 lb 12.8 oz)   08/18/21 71.1 kg (156 lb 12.8 oz)         BMI: Body mass index is 23.26 kg/m².       General:   alert, appears stated age and cooperative   Neck: No thyromegaly or LAD   Heart:: regular rate and rhythm, S1, S2 normal, no murmur, click, rub or gallop   Lungs: normal respiratory effort and auscultation   Abdomen: soft, non-tender, without masses or organomegaly   Breast: inspection negative, no nipple discharge or bleeding, no masses or nodularity palpable   Urethra and bladder: urethral meatus normal; bladder nontender to palpation;   Vulva: normal but atrophic   Vagina: normal, no lesion, atrophic   Cervix: multiparous appearance, no lesions and small amount of scarring    Uterus: normal size and anteverted   Adnexa: normal adnexa and no mass, fullness, tenderness       Assessment:    normal annual exam   History of cervical dysplasia  Tobacco use  menopause    Plan:    Plan     [x]  Mammogram request made  [x]  PAP done  []  Labs:   []  GC/Chl/TV  []  DEXA scan   []  Referral for colonoscopy:     Diagnoses and all orders for this visit:    1. Encounter for gynecological examination without abnormal finding (Primary)  -     IGP, Aptima HPV, Rfx 16 / 18,45    2. Cervical dysplasia, mild    3. Atypical squamous cell of undetermined significance of cervix  -     IGP, Aptima HPV, Rfx 16 / 18,45    4. History presenting hazard to health  -     IGP, Aptima HPV, Rfx 16 / 18,45    5. Screening for cervical cancer  -     IGP, Aptima " HPV, Rfx 16 / 18,45      High risk exam, recommend annual exam today history of cervical dysplasia    Counseling  [x]  Nutrition  [x]  Physical activity/regular exercise   [x]  Healthy weight  []  Injury prevention  []  Smoking cessation  []  Substance misuse/abuse  []  Sexual behavior  []  STD prevention  []  Contraception  []  Dental health  []  Mental health  []  Immunization  [x]  Encouraged SBE        Gloria Mohan MD  08/29/2022  13:28 EDT

## 2022-08-30 ENCOUNTER — HOSPITAL ENCOUNTER (OUTPATIENT)
Dept: BONE DENSITY | Facility: HOSPITAL | Age: 67
Discharge: HOME | End: 2022-08-30
Admitting: NURSE PRACTITIONER

## 2022-08-30 DIAGNOSIS — Z78.0 POST-MENOPAUSAL: ICD-10-CM

## 2022-08-30 PROCEDURE — 77080 DXA BONE DENSITY AXIAL: CPT

## 2023-11-01 ENCOUNTER — TRANSCRIBE ORDERS (OUTPATIENT)
Dept: ADMINISTRATIVE | Facility: HOSPITAL | Age: 68
End: 2023-11-01
Payer: MEDICARE

## 2023-11-01 ENCOUNTER — LAB (OUTPATIENT)
Dept: LAB | Facility: HOSPITAL | Age: 68
End: 2023-11-01
Payer: MEDICARE

## 2023-11-01 ENCOUNTER — HOSPITAL ENCOUNTER (OUTPATIENT)
Dept: MAMMOGRAPHY | Facility: HOSPITAL | Age: 68
Discharge: HOME | End: 2023-11-01
Payer: MEDICARE

## 2023-11-01 DIAGNOSIS — E11.9 DIABETES MELLITUS WITHOUT COMPLICATION: Primary | ICD-10-CM

## 2023-11-01 DIAGNOSIS — E11.9 DIABETES MELLITUS WITHOUT COMPLICATION: ICD-10-CM

## 2023-11-01 DIAGNOSIS — Z12.31 SCREENING MAMMOGRAM FOR BREAST CANCER: ICD-10-CM

## 2023-11-01 LAB — HBA1C MFR BLD: 7.7 % (ref 4.8–5.6)

## 2023-11-01 PROCEDURE — 36415 COLL VENOUS BLD VENIPUNCTURE: CPT

## 2023-11-01 PROCEDURE — 77067 SCR MAMMO BI INCL CAD: CPT

## 2023-11-01 PROCEDURE — 83036 HEMOGLOBIN GLYCOSYLATED A1C: CPT

## 2023-11-01 PROCEDURE — 77063 BREAST TOMOSYNTHESIS BI: CPT

## 2024-01-03 DIAGNOSIS — G47.00 INSOMNIA, UNSPECIFIED TYPE: ICD-10-CM

## 2024-01-08 RX ORDER — ZOLPIDEM TARTRATE 5 MG/1
5 TABLET ORAL NIGHTLY PRN
Qty: 30 TABLET | Refills: 0 | OUTPATIENT
Start: 2024-01-08

## 2024-01-08 NOTE — TELEPHONE ENCOUNTER
Looks like Dr. Pandey had ordered Ambien for her many years ago, but it is not something I would normally prescribe.  I would have her make an appointment with her PCP for this

## 2024-03-01 ENCOUNTER — OFFICE VISIT (OUTPATIENT)
Dept: OBSTETRICS AND GYNECOLOGY | Age: 69
End: 2024-03-01
Payer: MEDICARE

## 2024-03-01 VITALS
WEIGHT: 155 LBS | BODY MASS INDEX: 23.49 KG/M2 | HEIGHT: 68 IN | DIASTOLIC BLOOD PRESSURE: 84 MMHG | SYSTOLIC BLOOD PRESSURE: 128 MMHG

## 2024-03-01 DIAGNOSIS — Z01.419 WELL WOMAN EXAM WITH ROUTINE GYNECOLOGICAL EXAM: Primary | ICD-10-CM

## 2024-03-01 DIAGNOSIS — Z12.4 SCREENING FOR MALIGNANT NEOPLASM OF THE CERVIX: ICD-10-CM

## 2024-03-01 DIAGNOSIS — Z12.31 SCREENING MAMMOGRAM FOR BREAST CANCER: ICD-10-CM

## 2024-03-01 DIAGNOSIS — Z11.51 SCREENING FOR HPV (HUMAN PAPILLOMAVIRUS): ICD-10-CM

## 2024-03-01 DIAGNOSIS — N89.8 VAGINAL ITCHING: ICD-10-CM

## 2024-03-01 RX ORDER — ESZOPICLONE 3 MG/1
3 TABLET, FILM COATED ORAL
COMMUNITY
Start: 2023-11-12

## 2024-03-01 RX ORDER — MELATONIN
1000 DAILY
COMMUNITY

## 2024-03-01 RX ORDER — MULTIVIT WITH MINERALS/LUTEIN
250 TABLET ORAL DAILY
COMMUNITY

## 2024-03-01 NOTE — PROGRESS NOTES
Subjective     History of Present Illness    Chief Complaint   Patient presents with    Annual Exam     Annual exam, last pap 2022 neg/neg, m/g 2023, dexa 2022, colonoscopy        Shy Yates is a 69 y.o. female who presents for annual exam.  Patient of Dr. Mohan  C/o vaginal itching.  States it has been occurring for few days, thinks it is related to her diabetes.  States she had some extra sugar recently.  No vaginal odor or discharge.  Postmenopausal, no vaginal bleeding.  No urinary symptoms.  Smoking - doing acupunture. Down to 1/2 pack/day. Goal is to quit this year  PAP - normal/negative in . Hx cervical dysplasia   DEXA - osteopenia .  Has been taking vitamin D supplements, no calcium.   Colonoscopy -normal in , f/u 10 yrs  Follows with PCP - complete wellness labs with them and had a visit last month      Obstetric History:  OB History          5    Para   4    Term   4            AB   1    Living   4         SAB   1    IAB        Ectopic        Molar        Multiple        Live Births   4               Menstrual History:     No LMP recorded (lmp unknown). Patient is postmenopausal.           Current contraception: post menopausal status  History of abnormal Pap smear: yes - hx cervical dysplasia  Received Gardasil immunization: no  Perform regular self breast exam: yes -    Family history of uterine or ovarian cancer: no  Family History of colon cancer: no  Family history of breast cancer: yes - sister 41 y/o    Mammogram: up to date.  Colonoscopy: up to date.  DEXA: up to date.    Exercise: not active  Calcium/Vitamin D: adequate intake and uses supplements    The following portions of the patient's history were reviewed and updated as appropriate: allergies, current medications, past family history, past medical history, past social history, past surgical history, and problem list.    Review of Systems  Pertinent items are noted in HPI.    "    Objective   Physical Exam    /84   Ht 172.7 cm (68\")   Wt 70.3 kg (155 lb)   LMP  (LMP Unknown)   BMI 23.57 kg/m²      General: alert, appears stated age, cooperative, and no distress   Heart: regular rate and rhythm, S1, S2 normal, no murmur, click, rub or gallop   Lungs: clear to auscultation bilaterally   Abdomen: soft, non-tender, without masses or organomegaly   Breast: inspection negative, no nipple discharge or bleeding, no masses or nodularity palpable   External genitalia/Vulva: External genitalia including bartholin's glands, Urethra, Gard[place]'s gland and urethra meatus are normal and Bladder appears normal without significant prolapse    Vagina: normal mucosa, normal discharge   Cervix: no lesions   Uterus: normal size and non-tender   Adnexa: normal adnexa and no mass, fullness, tenderness   Neurologic: Alert and Oriented x3   Psychiatric: Normal affect, judgement, and mood       Assessment   Diagnoses and all orders for this visit:    1. Well woman exam with routine gynecological exam (Primary)  -     IGP, Apt HPV,rfx 16 / 18,45    2. Screening for HPV (human papillomavirus)  -     IGP, Apt HPV,rfx 16 / 18,45    3. Screening for malignant neoplasm of the cervix  -     IGP, Apt HPV,rfx 16 / 18,45    4. Screening mammogram for breast cancer  -     Cancel: Mammo Screening Digital Tomosynthesis Bilateral With CAD; Future  -     Mammo Screening Digital Tomosynthesis Bilateral With CAD; Future    5. Vaginal itching  -     NuSwab BV & Candida - Swab, Cervix            Plan   -Pap and NuSwab for yeast/BV done today, will call patient with results and treat accordingly  -Mammo ordered, in [facility] per patient request    As part of wellness and prevention, the following topics were discussed with the patient:  Encouraged self breast exam  Physical activity and regular exercised encouraged.   Nutrition discussed.  Smoking cessation discussed.  Encouraged vitamin D 600IU supplement and 1200mg calcium " supplement over the counter to prevent osteoporosis.    All questions answered.   Advised patient to contact me if any questions or concerns arise before their next annual appointment.  Return in 2 years (on 3/1/2026) for Medicare annual.

## 2024-03-04 LAB
A VAGINAE DNA VAG QL NAA+PROBE: ABNORMAL SCORE
BVAB2 DNA VAG QL NAA+PROBE: ABNORMAL SCORE
C ALBICANS DNA VAG QL NAA+PROBE: NEGATIVE
C GLABRATA DNA VAG QL NAA+PROBE: NEGATIVE
MEGA1 DNA VAG QL NAA+PROBE: ABNORMAL SCORE

## 2024-03-05 DIAGNOSIS — N76.0 BV (BACTERIAL VAGINOSIS): Primary | ICD-10-CM

## 2024-03-05 DIAGNOSIS — B96.89 BV (BACTERIAL VAGINOSIS): Primary | ICD-10-CM

## 2024-03-05 LAB
CYTOLOGIST CVX/VAG CYTO: NORMAL
CYTOLOGY CVX/VAG DOC CYTO: NORMAL
CYTOLOGY CVX/VAG DOC THIN PREP: NORMAL
DX ICD CODE: NORMAL
HPV I/H RISK 4 DNA CVX QL PROBE+SIG AMP: NEGATIVE
Lab: NORMAL
OTHER STN SPEC: NORMAL
STAT OF ADQ CVX/VAG CYTO-IMP: NORMAL

## 2024-03-05 RX ORDER — METRONIDAZOLE 500 MG/1
500 TABLET ORAL 2 TIMES DAILY
Qty: 14 TABLET | Refills: 0 | Status: SHIPPED | OUTPATIENT
Start: 2024-03-05 | End: 2024-03-12

## 2024-05-13 ENCOUNTER — TRANSCRIBE ORDERS (OUTPATIENT)
Dept: ADMINISTRATIVE | Facility: HOSPITAL | Age: 69
End: 2024-05-13
Payer: MEDICARE

## 2024-05-13 ENCOUNTER — LAB (OUTPATIENT)
Dept: LAB | Facility: HOSPITAL | Age: 69
End: 2024-05-13
Payer: MEDICARE

## 2024-05-13 DIAGNOSIS — E11.9 DIABETES MELLITUS WITHOUT COMPLICATION: Primary | ICD-10-CM

## 2024-05-13 DIAGNOSIS — E11.9 DIABETES MELLITUS WITHOUT COMPLICATION: ICD-10-CM

## 2024-05-13 LAB
ALBUMIN UR-MCNC: <1.2 MG/DL
ANION GAP SERPL CALCULATED.3IONS-SCNC: 13.4 MMOL/L (ref 5–15)
BUN SERPL-MCNC: 26 MG/DL (ref 8–23)
BUN/CREAT SERPL: 25.5 (ref 7–25)
CALCIUM SPEC-SCNC: 9.8 MG/DL (ref 8.6–10.5)
CHLORIDE SERPL-SCNC: 103 MMOL/L (ref 98–107)
CO2 SERPL-SCNC: 24.6 MMOL/L (ref 22–29)
CREAT SERPL-MCNC: 1.02 MG/DL (ref 0.57–1)
CREAT UR-MCNC: 60.1 MG/DL
EGFRCR SERPLBLD CKD-EPI 2021: 60.4 ML/MIN/1.73
GLUCOSE SERPL-MCNC: 149 MG/DL (ref 65–99)
HBA1C MFR BLD: 8.5 % (ref 4.8–5.6)
MICROALBUMIN/CREAT UR: NORMAL MG/G{CREAT}
POTASSIUM SERPL-SCNC: 4.3 MMOL/L (ref 3.5–5.2)
SODIUM SERPL-SCNC: 141 MMOL/L (ref 136–145)

## 2024-05-13 PROCEDURE — 80048 BASIC METABOLIC PNL TOTAL CA: CPT

## 2024-05-13 PROCEDURE — 36415 COLL VENOUS BLD VENIPUNCTURE: CPT

## 2024-05-13 PROCEDURE — 82043 UR ALBUMIN QUANTITATIVE: CPT

## 2024-05-13 PROCEDURE — 82570 ASSAY OF URINE CREATININE: CPT

## 2024-05-13 PROCEDURE — 83036 HEMOGLOBIN GLYCOSYLATED A1C: CPT

## 2024-08-07 ENCOUNTER — TRANSCRIBE ORDERS (OUTPATIENT)
Dept: ADMINISTRATIVE | Facility: HOSPITAL | Age: 69
End: 2024-08-07
Payer: MEDICARE

## 2024-08-07 DIAGNOSIS — Z78.0 POST-MENOPAUSAL: Primary | ICD-10-CM

## 2024-09-03 ENCOUNTER — HOSPITAL ENCOUNTER (OUTPATIENT)
Dept: BONE DENSITY | Facility: HOSPITAL | Age: 69
Discharge: HOME | End: 2024-09-03
Admitting: NURSE PRACTITIONER
Payer: MEDICARE

## 2024-09-03 DIAGNOSIS — Z78.0 POST-MENOPAUSAL: ICD-10-CM

## 2024-09-03 PROCEDURE — 77080 DXA BONE DENSITY AXIAL: CPT

## 2024-11-05 ENCOUNTER — HOSPITAL ENCOUNTER (OUTPATIENT)
Dept: MAMMOGRAPHY | Facility: HOSPITAL | Age: 69
Discharge: HOME | End: 2024-11-05
Payer: MEDICARE

## 2024-11-05 ENCOUNTER — TRANSCRIBE ORDERS (OUTPATIENT)
Dept: LAB | Facility: HOSPITAL | Age: 69
End: 2024-11-05
Payer: MEDICARE

## 2024-11-05 ENCOUNTER — LAB (OUTPATIENT)
Dept: LAB | Facility: HOSPITAL | Age: 69
End: 2024-11-05
Payer: MEDICARE

## 2024-11-05 DIAGNOSIS — E11.9 DIABETES MELLITUS WITHOUT COMPLICATION: ICD-10-CM

## 2024-11-05 DIAGNOSIS — Z12.31 SCREENING MAMMOGRAM FOR BREAST CANCER: ICD-10-CM

## 2024-11-05 DIAGNOSIS — E11.9 DIABETES MELLITUS WITHOUT COMPLICATION: Primary | ICD-10-CM

## 2024-11-05 PROCEDURE — 77067 SCR MAMMO BI INCL CAD: CPT

## 2024-11-05 PROCEDURE — 77063 BREAST TOMOSYNTHESIS BI: CPT

## 2024-11-07 DIAGNOSIS — Z12.31 SCREENING MAMMOGRAM FOR BREAST CANCER: Primary | ICD-10-CM

## 2025-02-04 ENCOUNTER — TRANSCRIBE ORDERS (OUTPATIENT)
Dept: LAB | Facility: HOSPITAL | Age: 70
End: 2025-02-04
Payer: MEDICARE

## 2025-02-04 ENCOUNTER — LAB (OUTPATIENT)
Dept: LAB | Facility: HOSPITAL | Age: 70
End: 2025-02-04
Payer: MEDICARE

## 2025-02-04 DIAGNOSIS — E11.9 DIABETES MELLITUS WITHOUT COMPLICATION: ICD-10-CM

## 2025-02-04 DIAGNOSIS — E11.9 DIABETES MELLITUS WITHOUT COMPLICATION: Primary | ICD-10-CM

## 2025-02-04 LAB — HBA1C MFR BLD: 7.7 % (ref 4.8–5.6)

## 2025-02-04 PROCEDURE — 83036 HEMOGLOBIN GLYCOSYLATED A1C: CPT

## 2025-02-04 PROCEDURE — 36415 COLL VENOUS BLD VENIPUNCTURE: CPT

## 2025-02-17 ENCOUNTER — TRANSCRIBE ORDERS (OUTPATIENT)
Dept: ADMINISTRATIVE | Facility: HOSPITAL | Age: 70
End: 2025-02-17
Payer: MEDICARE

## 2025-02-17 DIAGNOSIS — F17.210 CIGARETTE SMOKER: ICD-10-CM

## 2025-02-17 DIAGNOSIS — Z12.2 SCREENING FOR LUNG CANCER: ICD-10-CM

## 2025-02-17 DIAGNOSIS — Z12.2 ENCOUNTER FOR SCREENING FOR LUNG CANCER: Primary | ICD-10-CM

## 2025-02-26 ENCOUNTER — HOSPITAL ENCOUNTER (OUTPATIENT)
Dept: CT IMAGING | Facility: HOSPITAL | Age: 70
Discharge: HOME | End: 2025-02-26
Admitting: NURSE PRACTITIONER
Payer: MEDICARE

## 2025-02-26 DIAGNOSIS — F17.210 CIGARETTE SMOKER: ICD-10-CM

## 2025-02-26 PROCEDURE — 71271 CT THORAX LUNG CANCER SCR C-: CPT

## 2025-03-12 ENCOUNTER — TRANSCRIBE ORDERS (OUTPATIENT)
Dept: ADMINISTRATIVE | Facility: HOSPITAL | Age: 70
End: 2025-03-12
Payer: MEDICARE

## 2025-03-12 DIAGNOSIS — R91.8 ABNORMAL FINDINGS ON DIAGNOSTIC IMAGING OF LUNG: Primary | ICD-10-CM

## 2025-03-13 ENCOUNTER — TELEPHONE (OUTPATIENT)
Dept: PULMONOLOGY | Facility: CLINIC | Age: 70
End: 2025-03-13

## 2025-03-13 ENCOUNTER — OFFICE VISIT (OUTPATIENT)
Dept: PULMONOLOGY | Facility: CLINIC | Age: 70
End: 2025-03-13
Payer: MEDICARE

## 2025-03-13 VITALS
OXYGEN SATURATION: 98 % | TEMPERATURE: 97.7 F | RESPIRATION RATE: 18 BRPM | BODY MASS INDEX: 23.49 KG/M2 | HEIGHT: 68 IN | WEIGHT: 155 LBS | DIASTOLIC BLOOD PRESSURE: 56 MMHG | HEART RATE: 80 BPM | SYSTOLIC BLOOD PRESSURE: 113 MMHG

## 2025-03-13 DIAGNOSIS — R91.8 LUNG NODULES: Primary | ICD-10-CM

## 2025-03-13 PROCEDURE — 3074F SYST BP LT 130 MM HG: CPT | Performed by: STUDENT IN AN ORGANIZED HEALTH CARE EDUCATION/TRAINING PROGRAM

## 2025-03-13 PROCEDURE — 1159F MED LIST DOCD IN RCRD: CPT | Performed by: STUDENT IN AN ORGANIZED HEALTH CARE EDUCATION/TRAINING PROGRAM

## 2025-03-13 PROCEDURE — 1160F RVW MEDS BY RX/DR IN RCRD: CPT | Performed by: STUDENT IN AN ORGANIZED HEALTH CARE EDUCATION/TRAINING PROGRAM

## 2025-03-13 PROCEDURE — 3078F DIAST BP <80 MM HG: CPT | Performed by: STUDENT IN AN ORGANIZED HEALTH CARE EDUCATION/TRAINING PROGRAM

## 2025-03-13 PROCEDURE — 99204 OFFICE O/P NEW MOD 45 MIN: CPT | Performed by: STUDENT IN AN ORGANIZED HEALTH CARE EDUCATION/TRAINING PROGRAM

## 2025-03-13 RX ORDER — ROSUVASTATIN CALCIUM 20 MG/1
20 TABLET, COATED ORAL DAILY
COMMUNITY
Start: 2025-02-12

## 2025-03-13 RX ORDER — LANCETS
EACH MISCELLANEOUS
COMMUNITY
Start: 2025-02-01

## 2025-03-13 RX ORDER — INSULIN GLARGINE 100 [IU]/ML
INJECTION, SOLUTION SUBCUTANEOUS
COMMUNITY
Start: 2024-12-17

## 2025-03-13 RX ORDER — BOLUS INSULIN PUMP, 200 UNIT 2 UNIT
EACH MISCELLANEOUS
COMMUNITY
Start: 2025-02-21

## 2025-03-13 NOTE — TELEPHONE ENCOUNTER
Called patient to notify her of Bronchoscopy being scheduled that Dr. Najera had spoke to her about. I had to leave a voicemail stating procedure was scheduled for Monday 3/17/25 and someone from Surgery Scheduling will call her with an arrival time. Included instructions in the message as well.

## 2025-03-13 NOTE — H&P (VIEW-ONLY)
Primary Care Provider  Milton Richards MD   Referring Provider  JOSEFINA Edmonds      Patient Complaint  Establish Care and Abnormal Imaging      Subjective          Shy Yates presents to [facility] PULMONARY & CRITICAL CARE MEDICINE      History of Presenting Illness  Shy Yates is a 70 y.o. female here as a new patient for evaluation of abnormal chest CT    Patient had a low-dose CT done 2025 for lung cancer screening which showed several subcentimeter cavitary lesions.  Patient was asymptomatic at the time of her CT  Has had history of COVID   Currently smoking 1ppd for >10 years  No shortness of breath, hemoptysis, weight loss;  Had URI symptoms recently with lingering cough, mostly nonproductive  No family history of lung cancer  No using any inhalers at this time  I have personally reviewed patient's past family, social, medical and surgical histories and agree with their findings.    Review of Systems  Constitutional:  No fever. No chills. No weakness.  Eyes: No pain, erythema, or discharge. No blurring of vision.  ENT:  No sore throat, URI symptoms.   Cardiovascular:  No chest pain. No palpitations. No lower extremity edema.  Respiratory:  No shortness of breath, +cough, pleuritic chest pain. No hemoptysis. No dyspnea.   GI:  Normal appetite. No nausea, vomiting, diarrhea. No pain. No melena.  Musculoskeletal:  No arthralgias or myalgias.  Neurologic:  No headache. No weakness.    Family History   Problem Relation Age of Onset    Stroke Father     Diabetes type II Father     Kidney cancer Father     Diabetes Father     No Known Problems Mother          @ 93.    Rectal cancer Brother         Late 50's    Breast cancer Sister 40    No Known Problems Son     Diabetes type II Daughter         IDDM    Stroke Paternal Grandfather 90    Diabetes Paternal Grandmother     Heart failure Paternal Grandmother     Hypertension Maternal Grandmother     Diabetes  "Maternal Grandmother     Hypertension Maternal Grandfather     Diabetes Maternal Grandfather     Stroke Maternal Grandfather     No Known Problems Maternal Aunt         90's    No Known Problems Maternal Aunt         90's    No Known Problems Paternal Aunt         80's    No Known Problems Paternal Aunt         93 yoa    BRCA 1/2 Neg Hx     Colon cancer Neg Hx     Endometrial cancer Neg Hx     Ovarian cancer Neg Hx     Malig Hyperthermia Neg Hx         Social History     Socioeconomic History    Marital status:     Number of children: 4   Tobacco Use    Smoking status: Every Day     Current packs/day: 1.00     Average packs/day: 0.8 packs/day for 16.1 years (12.4 ttl pk-yrs)     Types: Cigarettes     Start date: 2014     Passive exposure: Current    Smokeless tobacco: Never    Tobacco comments:     I never smoked while pregnant   Vaping Use    Vaping status: Former    Substances: Nicotine   Substance and Sexual Activity    Alcohol use: Yes     Alcohol/week: 3.0 standard drinks of alcohol     Types: 3 Drinks containing 0.5 oz of alcohol per week     Comment: rare    Drug use: Never    Sexual activity: Not Currently     Partners: Male     Birth control/protection: Post-menopausal     Comment: sig other         Past Medical History:   Diagnosis Date    Abnormal mammogram of left breast     Anemia     Anxiety     Brain bleed     Had a brain bleed as a child and then again as an adult in . Neurologist, Dr Miguel A Lynn.    Chorea     Colon polyps 2007    Found at her 1st colonoscopy at age 52.    Depression       2 yr ago.    Encounter for screening for malignant neoplasm of colon 2019    Genital warts 2018:  Extensive on both labia majora and minora, perineum and perianally.  Treated multiple times with podophyllin or BCA or TCA.  May 21, 2019: All have totally resolved.    History of Papanicolaou smear of cervix     Pap smear Hx: \"Never had abnormal Paps\" " before I saw her.  , neg pap (atrophic pattern, predominantly parabasal cells) , neg HR-HPV.  Dec, 2017, LGSIL, negative HR-HPV.  Attempted to contact patient about this until we were able to get her in for a colposcopy on 2018.     History of Papanicolaou smear of cervix 2018: Colposcopy shows multiple flat raised condylomata on both labia majora and a row of micro-condyloma in between the right labia minora and majora.  Several in the anterior commissure.  Several at the anus.  Colposcopy showed white epithelium at 12 and 6:00, biopsied= ECC negative, biopsies at 6 and 12 show LGSIL.  Repeat Pap at that time= 2018, LGSIL, positive HR-HPV.    HPV (human papilloma virus) infection 2018    Hyperlipemia     On Zocor for 6 months.    Hypertension     Long Hx     IDDM (insulin dependent diabetes mellitus)     IDDM: After meals. Last Hgb A1c= 7.5.     Insomnia     Started melatonin last couple of nights. Doing better.    LGSIL on Pap smear of cervix 2018    See note of history of Pap smear, 2018    Menopause     Migraine headache     No Aura. Resolved.    Molluscum contagiosum 2018    Pain in left hand     Carpel tunnel syndrome.    Spinal headache     After     Varicella     Chicken pox as a child     (vaginal birth after )         Immunization History   Administered Date(s) Administered    Pneumococcal Conjugate 13-Valent (PCV13) 2016       Allergies   Allergen Reactions    Aspirin Itching    Penicillins Itching          Current Outpatient Medications:     Blood Glucose Monitoring Suppl (ACCU-CHEK ASPEN PLUS) w/Device kit, 4 (Four) Times a Day., Disp: , Rfl: 0    Cholecalciferol 25 MCG (1000 UT) tablet, Take 1 tablet by mouth Daily., Disp: , Rfl:     citalopram (CeleXA) 10 MG tablet, Take 1 tablet by mouth Daily., Disp: , Rfl:     escitalopram (LEXAPRO) 20 MG tablet, TAKE A HALF TABLET BY MOUTH EVERY DAY FOR 2 WEEKS, THEN TAKE ONE TABLET BY  MOUTH DAILY, Disp: , Rfl:     eszopiclone (LUNESTA) 3 MG tablet, Take 1 tablet by mouth every night at bedtime., Disp: , Rfl:     fenofibrate (TRICOR) 145 MG tablet, Take 1 tablet by mouth Daily., Disp: , Rfl:     glucose blood test strip, Use as instructed, Disp: 120 each, Rfl: 12    glucose monitor monitoring kit, 1 each 4 (Four) Times a Day., Disp: 1 each, Rfl: 0    HumaLOG 100 UNIT/ML injection, INJECT 100 UNITS VIA PUMP EVERY DAY, Disp: , Rfl:     hydrOXYzine pamoate (VISTARIL) 25 MG capsule, TAKE 1 TO 4 CAPSULES BY MOUTH EVERY NIGHT AT BEDTIME, Disp: , Rfl:     Insulin Disposable Pump (OmniPod Dash 5 Pack Pods) misc, CHANGE POD EVERY 3 DAYS., Disp: , Rfl:     lamoTRIgine (LaMICtal) 100 MG tablet, Take 1 tablet by mouth Daily., Disp: , Rfl:     lisinopril-hydrochlorothiazide (PRINZIDE,ZESTORETIC) 20-12.5 MG per tablet, , Disp: , Rfl:     simvastatin (ZOCOR) 20 MG tablet, Take 1 tablet by mouth Every Night., Disp: , Rfl:     temazepam (RESTORIL) 15 MG capsule, Take 1 capsule by mouth every night at bedtime., Disp: , Rfl:     topiramate (TOPAMAX) 25 MG tablet, Take 1 tablet by mouth., Disp: , Rfl:     vitamin C (ASCORBIC ACID) 250 MG tablet, Take 1 tablet by mouth Daily., Disp: , Rfl:     Xigduo XR 5-1000 MG tablet, Take 2 tablets by mouth Daily., Disp: , Rfl:     zolpidem (AMBIEN) 5 MG tablet, Take 1 tablet by mouth At Night As Needed for Sleep. (Patient not taking: Reported on 3/1/2024), Disp: 30 tablet, Rfl: 0     Objective     Vital Signs:   There were no vitals taken for this visit.    Physical Exam  There were no vitals filed for this visit.    General: Alert, NAD  HEENT:  EOMI, no sinus tenderness  Neck:  Supple, no thyromegaly,  no JVD  Lymph: no cervical, supraclavicular lymphadenopathy bilaterally  Chest:  clear to auscultation bilaterally, no wheezing or crackles; no work of breathing noted  CV: RRR, no M/G/R, pulses 2+, equal.  Abd:  Soft, NT, ND, +BS  EXT:  no clubbing, no cyanosis, no edema  b/l  Neuro:  A&Ox3, CN grossly intact, no focal deficits  Skin: No rashes or lesions noted       Result Review :   I have personally reviewed patient's labs and images.          Assessment and Plan      Patient Active Problem List   Diagnosis    Vulvovaginitis due to Candida    IDDM (insulin dependent diabetes mellitus)    Cervical dysplasia, mild    Insomnia    Hypertension    Hyperlipemia    Depression    Chorea    Colon polyps    Encounter for screening for malignant neoplasm of colon       Impression and Plan:    Cavitary lesions of lung  Abnormal chest CT  Active tobacco use, 1 ppd >10+ yrs    low-dose CT done 2/26/2025 for lung cancer screening which showed several subcentimeter cavitary lesions.  Will proceed with Bronchoscopy with BAL  Risk and benefits explained to patient. Risk including bleeding, infection, pneumothorax, and even death can occur. Patient expressed understanding and would like to proceed.  Follow up thereafter  Will repeat chest CT 05/2025    Vaccination status: Up-to-date    Medications personally reviewed.    Follow Up   No follow-ups on file.  Patient was given instructions and counseling regarding her condition or for health maintenance advice. Please see specific information pulled into the AVS if appropriate.

## 2025-03-13 NOTE — PROGRESS NOTES
Primary Care Provider  Milton Richards MD   Referring Provider  JOSEFINA Edmonds      Patient Complaint  Establish Care and Abnormal Imaging      Subjective          Shy Yates presents to [facility] PULMONARY & CRITICAL CARE MEDICINE      History of Presenting Illness  Shy Yates is a 70 y.o. female here as a new patient for evaluation of abnormal chest CT    Patient had a low-dose CT done 2025 for lung cancer screening which showed several subcentimeter cavitary lesions.  Patient was asymptomatic at the time of her CT  Has had history of COVID   Currently smoking 1ppd for >10 years  No shortness of breath, hemoptysis, weight loss;  Had URI symptoms recently with lingering cough, mostly nonproductive  No family history of lung cancer  No using any inhalers at this time  I have personally reviewed patient's past family, social, medical and surgical histories and agree with their findings.    Review of Systems  Constitutional:  No fever. No chills. No weakness.  Eyes: No pain, erythema, or discharge. No blurring of vision.  ENT:  No sore throat, URI symptoms.   Cardiovascular:  No chest pain. No palpitations. No lower extremity edema.  Respiratory:  No shortness of breath, +cough, pleuritic chest pain. No hemoptysis. No dyspnea.   GI:  Normal appetite. No nausea, vomiting, diarrhea. No pain. No melena.  Musculoskeletal:  No arthralgias or myalgias.  Neurologic:  No headache. No weakness.    Family History   Problem Relation Age of Onset    Stroke Father     Diabetes type II Father     Kidney cancer Father     Diabetes Father     No Known Problems Mother          @ 93.    Rectal cancer Brother         Late 50's    Breast cancer Sister 40    No Known Problems Son     Diabetes type II Daughter         IDDM    Stroke Paternal Grandfather 90    Diabetes Paternal Grandmother     Heart failure Paternal Grandmother     Hypertension Maternal Grandmother     Diabetes  "Maternal Grandmother     Hypertension Maternal Grandfather     Diabetes Maternal Grandfather     Stroke Maternal Grandfather     No Known Problems Maternal Aunt         90's    No Known Problems Maternal Aunt         90's    No Known Problems Paternal Aunt         80's    No Known Problems Paternal Aunt         93 yoa    BRCA 1/2 Neg Hx     Colon cancer Neg Hx     Endometrial cancer Neg Hx     Ovarian cancer Neg Hx     Malig Hyperthermia Neg Hx         Social History     Socioeconomic History    Marital status:     Number of children: 4   Tobacco Use    Smoking status: Every Day     Current packs/day: 1.00     Average packs/day: 0.8 packs/day for 16.1 years (12.4 ttl pk-yrs)     Types: Cigarettes     Start date: 2014     Passive exposure: Current    Smokeless tobacco: Never    Tobacco comments:     I never smoked while pregnant   Vaping Use    Vaping status: Former    Substances: Nicotine   Substance and Sexual Activity    Alcohol use: Yes     Alcohol/week: 3.0 standard drinks of alcohol     Types: 3 Drinks containing 0.5 oz of alcohol per week     Comment: rare    Drug use: Never    Sexual activity: Not Currently     Partners: Male     Birth control/protection: Post-menopausal     Comment: sig other         Past Medical History:   Diagnosis Date    Abnormal mammogram of left breast     Anemia     Anxiety     Brain bleed     Had a brain bleed as a child and then again as an adult in . Neurologist, Dr Miguel A Lynn.    Chorea     Colon polyps 2007    Found at her 1st colonoscopy at age 52.    Depression       2 yr ago.    Encounter for screening for malignant neoplasm of colon 2019    Genital warts 2018:  Extensive on both labia majora and minora, perineum and perianally.  Treated multiple times with podophyllin or BCA or TCA.  May 21, 2019: All have totally resolved.    History of Papanicolaou smear of cervix     Pap smear Hx: \"Never had abnormal Paps\" " before I saw her.  , neg pap (atrophic pattern, predominantly parabasal cells) , neg HR-HPV.  Dec, 2017, LGSIL, negative HR-HPV.  Attempted to contact patient about this until we were able to get her in for a colposcopy on 2018.     History of Papanicolaou smear of cervix 2018: Colposcopy shows multiple flat raised condylomata on both labia majora and a row of micro-condyloma in between the right labia minora and majora.  Several in the anterior commissure.  Several at the anus.  Colposcopy showed white epithelium at 12 and 6:00, biopsied= ECC negative, biopsies at 6 and 12 show LGSIL.  Repeat Pap at that time= 2018, LGSIL, positive HR-HPV.    HPV (human papilloma virus) infection 2018    Hyperlipemia     On Zocor for 6 months.    Hypertension     Long Hx     IDDM (insulin dependent diabetes mellitus)     IDDM: After meals. Last Hgb A1c= 7.5.     Insomnia     Started melatonin last couple of nights. Doing better.    LGSIL on Pap smear of cervix 2018    See note of history of Pap smear, 2018    Menopause     Migraine headache     No Aura. Resolved.    Molluscum contagiosum 2018    Pain in left hand     Carpel tunnel syndrome.    Spinal headache     After     Varicella     Chicken pox as a child     (vaginal birth after )         Immunization History   Administered Date(s) Administered    Pneumococcal Conjugate 13-Valent (PCV13) 2016       Allergies   Allergen Reactions    Aspirin Itching    Penicillins Itching          Current Outpatient Medications:     Blood Glucose Monitoring Suppl (ACCU-CHEK ASPEN PLUS) w/Device kit, 4 (Four) Times a Day., Disp: , Rfl: 0    Cholecalciferol 25 MCG (1000 UT) tablet, Take 1 tablet by mouth Daily., Disp: , Rfl:     citalopram (CeleXA) 10 MG tablet, Take 1 tablet by mouth Daily., Disp: , Rfl:     escitalopram (LEXAPRO) 20 MG tablet, TAKE A HALF TABLET BY MOUTH EVERY DAY FOR 2 WEEKS, THEN TAKE ONE TABLET BY  MOUTH DAILY, Disp: , Rfl:     eszopiclone (LUNESTA) 3 MG tablet, Take 1 tablet by mouth every night at bedtime., Disp: , Rfl:     fenofibrate (TRICOR) 145 MG tablet, Take 1 tablet by mouth Daily., Disp: , Rfl:     glucose blood test strip, Use as instructed, Disp: 120 each, Rfl: 12    glucose monitor monitoring kit, 1 each 4 (Four) Times a Day., Disp: 1 each, Rfl: 0    HumaLOG 100 UNIT/ML injection, INJECT 100 UNITS VIA PUMP EVERY DAY, Disp: , Rfl:     hydrOXYzine pamoate (VISTARIL) 25 MG capsule, TAKE 1 TO 4 CAPSULES BY MOUTH EVERY NIGHT AT BEDTIME, Disp: , Rfl:     Insulin Disposable Pump (OmniPod Dash 5 Pack Pods) misc, CHANGE POD EVERY 3 DAYS., Disp: , Rfl:     lamoTRIgine (LaMICtal) 100 MG tablet, Take 1 tablet by mouth Daily., Disp: , Rfl:     lisinopril-hydrochlorothiazide (PRINZIDE,ZESTORETIC) 20-12.5 MG per tablet, , Disp: , Rfl:     simvastatin (ZOCOR) 20 MG tablet, Take 1 tablet by mouth Every Night., Disp: , Rfl:     temazepam (RESTORIL) 15 MG capsule, Take 1 capsule by mouth every night at bedtime., Disp: , Rfl:     topiramate (TOPAMAX) 25 MG tablet, Take 1 tablet by mouth., Disp: , Rfl:     vitamin C (ASCORBIC ACID) 250 MG tablet, Take 1 tablet by mouth Daily., Disp: , Rfl:     Xigduo XR 5-1000 MG tablet, Take 2 tablets by mouth Daily., Disp: , Rfl:     zolpidem (AMBIEN) 5 MG tablet, Take 1 tablet by mouth At Night As Needed for Sleep. (Patient not taking: Reported on 3/1/2024), Disp: 30 tablet, Rfl: 0     Objective     Vital Signs:   There were no vitals taken for this visit.    Physical Exam  There were no vitals filed for this visit.    General: Alert, NAD  HEENT:  EOMI, no sinus tenderness  Neck:  Supple, no thyromegaly,  no JVD  Lymph: no cervical, supraclavicular lymphadenopathy bilaterally  Chest:  clear to auscultation bilaterally, no wheezing or crackles; no work of breathing noted  CV: RRR, no M/G/R, pulses 2+, equal.  Abd:  Soft, NT, ND, +BS  EXT:  no clubbing, no cyanosis, no edema  b/l  Neuro:  A&Ox3, CN grossly intact, no focal deficits  Skin: No rashes or lesions noted       Result Review :   I have personally reviewed patient's labs and images.          Assessment and Plan      Patient Active Problem List   Diagnosis    Vulvovaginitis due to Candida    IDDM (insulin dependent diabetes mellitus)    Cervical dysplasia, mild    Insomnia    Hypertension    Hyperlipemia    Depression    Chorea    Colon polyps    Encounter for screening for malignant neoplasm of colon       Impression and Plan:    Cavitary lesions of lung  Abnormal chest CT  Active tobacco use, 1 ppd >10+ yrs    low-dose CT done 2/26/2025 for lung cancer screening which showed several subcentimeter cavitary lesions.  Will proceed with Bronchoscopy with BAL  Risk and benefits explained to patient. Risk including bleeding, infection, pneumothorax, and even death can occur. Patient expressed understanding and would like to proceed.  Follow up thereafter  Will repeat chest CT 05/2025    Vaccination status: Up-to-date    Medications personally reviewed.    Follow Up   No follow-ups on file.  Patient was given instructions and counseling regarding her condition or for health maintenance advice. Please see specific information pulled into the AVS if appropriate.

## 2025-03-17 ENCOUNTER — ANESTHESIA EVENT (OUTPATIENT)
Dept: PERIOP | Facility: HOSPITAL | Age: 70
End: 2025-03-17
Payer: MEDICARE

## 2025-03-17 ENCOUNTER — HOSPITAL ENCOUNTER (OUTPATIENT)
Facility: HOSPITAL | Age: 70
Setting detail: HOSPITAL OUTPATIENT SURGERY
Discharge: HOME | End: 2025-03-17
Attending: STUDENT IN AN ORGANIZED HEALTH CARE EDUCATION/TRAINING PROGRAM | Admitting: STUDENT IN AN ORGANIZED HEALTH CARE EDUCATION/TRAINING PROGRAM
Payer: MEDICARE

## 2025-03-17 ENCOUNTER — ANESTHESIA (OUTPATIENT)
Dept: PERIOP | Facility: HOSPITAL | Age: 70
End: 2025-03-17
Payer: MEDICARE

## 2025-03-17 VITALS
DIASTOLIC BLOOD PRESSURE: 65 MMHG | RESPIRATION RATE: 18 BRPM | SYSTOLIC BLOOD PRESSURE: 130 MMHG | HEART RATE: 69 BPM | TEMPERATURE: 97.5 F | OXYGEN SATURATION: 93 %

## 2025-03-17 DIAGNOSIS — R91.8 LUNG NODULES: ICD-10-CM

## 2025-03-17 LAB
ACB CMPLX DNA BAL NAA+NON-PRB-NCNCRNG: NOT DETECTED
ACB CMPLX DNA BAL NAA+NON-PRB-NCNCRNG: NOT DETECTED
BLACTX-M ISLT/SPM QL: ABNORMAL
BLACTX-M ISLT/SPM QL: NORMAL
BLAIMP ISLT/SPM QL: ABNORMAL
BLAIMP ISLT/SPM QL: NORMAL
BLAKPC ISLT/SPM QL: ABNORMAL
BLAKPC ISLT/SPM QL: NORMAL
BLAOXA-48-LIKE ISLT/SPM QL: ABNORMAL
BLAOXA-48-LIKE ISLT/SPM QL: NORMAL
BLAVIM ISLT/SPM QL: ABNORMAL
BLAVIM ISLT/SPM QL: NORMAL
C PNEUM DNA NPH QL NAA+NON-PROBE: NOT DETECTED
C PNEUM DNA NPH QL NAA+NON-PROBE: NOT DETECTED
CILIATED BAL QL: 31 %
CILIATED BAL QL: 55 %
E CLOAC COMP DNA BAL NAA+NON-PRB-NCNCRNG: NOT DETECTED
E CLOAC COMP DNA BAL NAA+NON-PRB-NCNCRNG: NOT DETECTED
E COLI DNA BAL NAA+NON-PRB-NCNCRNG: NOT DETECTED
E COLI DNA BAL NAA+NON-PRB-NCNCRNG: NOT DETECTED
FLUAV SUBTYP SPEC NAA+PROBE: NOT DETECTED
FLUAV SUBTYP SPEC NAA+PROBE: NOT DETECTED
FLUBV RNA ISLT QL NAA+PROBE: NOT DETECTED
FLUBV RNA ISLT QL NAA+PROBE: NOT DETECTED
GP B STREP DNA BAL NAA+NON-PRB-NCNCRNG: DETECTED
GP B STREP DNA BAL NAA+NON-PRB-NCNCRNG: NOT DETECTED
HADV DNA SPEC NAA+PROBE: NOT DETECTED
HADV DNA SPEC NAA+PROBE: NOT DETECTED
HAEM INFLU DNA BAL NAA+NON-PRB-NCNCRNG: NOT DETECTED
HAEM INFLU DNA BAL NAA+NON-PRB-NCNCRNG: NOT DETECTED
HCOV RNA LOWER RESP QL NAA+NON-PROBE: NOT DETECTED
HCOV RNA LOWER RESP QL NAA+NON-PROBE: NOT DETECTED
HMPV RNA NPH QL NAA+NON-PROBE: NOT DETECTED
HMPV RNA NPH QL NAA+NON-PROBE: NOT DETECTED
HPIV RNA LOWER RESP QL NAA+NON-PROBE: NOT DETECTED
HPIV RNA LOWER RESP QL NAA+NON-PROBE: NOT DETECTED
K AEROGENES DNA BAL NAA+NON-PRB-NCNCRNG: NOT DETECTED
K AEROGENES DNA BAL NAA+NON-PRB-NCNCRNG: NOT DETECTED
K OXYTOCA DNA BAL NAA+NON-PRB-NCNCRNG: NOT DETECTED
K OXYTOCA DNA BAL NAA+NON-PRB-NCNCRNG: NOT DETECTED
K PNEU GRP DNA BAL NAA+NON-PRB-NCNCRNG: NOT DETECTED
K PNEU GRP DNA BAL NAA+NON-PRB-NCNCRNG: NOT DETECTED
L PNEUMO DNA LOWER RESP QL NAA+NON-PROBE: NOT DETECTED
L PNEUMO DNA LOWER RESP QL NAA+NON-PROBE: NOT DETECTED
LYMPHOCYTES NFR FLD MANUAL: 33 %
LYMPHOCYTES NFR FLD MANUAL: 66 %
M CATARRHALIS DNA BAL NAA+NON-PRB-NCNCRNG: NOT DETECTED
M CATARRHALIS DNA BAL NAA+NON-PRB-NCNCRNG: NOT DETECTED
M PNEUMO IGG SER IA-ACNC: NOT DETECTED
M PNEUMO IGG SER IA-ACNC: NOT DETECTED
MACROPHAGE FLUID %: 12 %
MACROPHAGE FLUID %: 3 %
MECA+MECC ISLT/SPM QL: ABNORMAL
MECA+MECC ISLT/SPM QL: NORMAL
NDM GENE: ABNORMAL
NDM GENE: NORMAL
P AERUGINOSA DNA BAL NAA+NON-PRB-NCNCRNG: NOT DETECTED
P AERUGINOSA DNA BAL NAA+NON-PRB-NCNCRNG: NOT DETECTED
PROTEUS SP DNA BAL NAA+NON-PRB-NCNCRNG: NOT DETECTED
PROTEUS SP DNA BAL NAA+NON-PRB-NCNCRNG: NOT DETECTED
RHINOVIRUS RNA SPEC NAA+PROBE: NOT DETECTED
RHINOVIRUS RNA SPEC NAA+PROBE: NOT DETECTED
RSV RNA NPH QL NAA+NON-PROBE: NOT DETECTED
RSV RNA NPH QL NAA+NON-PROBE: NOT DETECTED
S AUREUS DNA BAL NAA+NON-PRB-NCNCRNG: NOT DETECTED
S AUREUS DNA BAL NAA+NON-PRB-NCNCRNG: NOT DETECTED
S MARCESCENS DNA BAL NAA+NON-PRB-NCNCRNG: NOT DETECTED
S MARCESCENS DNA BAL NAA+NON-PRB-NCNCRNG: NOT DETECTED
S PNEUM DNA BAL NAA+NON-PRB-NCNCRNG: NOT DETECTED
S PNEUM DNA BAL NAA+NON-PRB-NCNCRNG: NOT DETECTED
S PYO DNA BAL NAA+NON-PRB-NCNCRNG: NOT DETECTED
S PYO DNA BAL NAA+NON-PRB-NCNCRNG: NOT DETECTED
VISUAL PRESENCE OF BLOOD: NORMAL
VISUAL PRESENCE OF BLOOD: NORMAL

## 2025-03-17 PROCEDURE — 25010000002 MIDAZOLAM PER 1MG: Performed by: ANESTHESIOLOGY

## 2025-03-17 PROCEDURE — 25810000003 LACTATED RINGERS PER 1000 ML: Performed by: ANESTHESIOLOGY

## 2025-03-17 PROCEDURE — 88108 CYTOPATH CONCENTRATE TECH: CPT | Performed by: STUDENT IN AN ORGANIZED HEALTH CARE EDUCATION/TRAINING PROGRAM

## 2025-03-17 PROCEDURE — 87071 CULTURE AEROBIC QUANT OTHER: CPT | Performed by: STUDENT IN AN ORGANIZED HEALTH CARE EDUCATION/TRAINING PROGRAM

## 2025-03-17 PROCEDURE — 87205 SMEAR GRAM STAIN: CPT | Performed by: STUDENT IN AN ORGANIZED HEALTH CARE EDUCATION/TRAINING PROGRAM

## 2025-03-17 PROCEDURE — 25010000002 LIDOCAINE HCL (PF) 4 % SOLUTION: Performed by: STUDENT IN AN ORGANIZED HEALTH CARE EDUCATION/TRAINING PROGRAM

## 2025-03-17 PROCEDURE — 25010000002 LIDOCAINE PF 2% 2 % SOLUTION

## 2025-03-17 PROCEDURE — 89051 BODY FLUID CELL COUNT: CPT | Performed by: STUDENT IN AN ORGANIZED HEALTH CARE EDUCATION/TRAINING PROGRAM

## 2025-03-17 PROCEDURE — 25010000002 DEXAMETHASONE PER 1 MG

## 2025-03-17 PROCEDURE — 87206 SMEAR FLUORESCENT/ACID STAI: CPT | Performed by: STUDENT IN AN ORGANIZED HEALTH CARE EDUCATION/TRAINING PROGRAM

## 2025-03-17 PROCEDURE — 25010000002 PROPOFOL 10 MG/ML EMULSION

## 2025-03-17 PROCEDURE — 87252 VIRUS INOCULATION TISSUE: CPT | Performed by: STUDENT IN AN ORGANIZED HEALTH CARE EDUCATION/TRAINING PROGRAM

## 2025-03-17 PROCEDURE — 88312 SPECIAL STAINS GROUP 1: CPT | Performed by: STUDENT IN AN ORGANIZED HEALTH CARE EDUCATION/TRAINING PROGRAM

## 2025-03-17 PROCEDURE — 87070 CULTURE OTHR SPECIMN AEROBIC: CPT | Performed by: STUDENT IN AN ORGANIZED HEALTH CARE EDUCATION/TRAINING PROGRAM

## 2025-03-17 PROCEDURE — 87102 FUNGUS ISOLATION CULTURE: CPT | Performed by: STUDENT IN AN ORGANIZED HEALTH CARE EDUCATION/TRAINING PROGRAM

## 2025-03-17 PROCEDURE — 87633 RESP VIRUS 12-25 TARGETS: CPT | Performed by: STUDENT IN AN ORGANIZED HEALTH CARE EDUCATION/TRAINING PROGRAM

## 2025-03-17 PROCEDURE — 25010000002 ONDANSETRON PER 1 MG

## 2025-03-17 PROCEDURE — 87116 MYCOBACTERIA CULTURE: CPT | Performed by: STUDENT IN AN ORGANIZED HEALTH CARE EDUCATION/TRAINING PROGRAM

## 2025-03-17 PROCEDURE — 87496 CYTOMEG DNA AMP PROBE: CPT | Performed by: STUDENT IN AN ORGANIZED HEALTH CARE EDUCATION/TRAINING PROGRAM

## 2025-03-17 PROCEDURE — 31624 DX BRONCHOSCOPE/LAVAGE: CPT | Performed by: STUDENT IN AN ORGANIZED HEALTH CARE EDUCATION/TRAINING PROGRAM

## 2025-03-17 RX ORDER — PROMETHAZINE HYDROCHLORIDE 25 MG/1
25 TABLET ORAL ONCE AS NEEDED
Status: DISCONTINUED | OUTPATIENT
Start: 2025-03-17 | End: 2025-03-17 | Stop reason: HOSPADM

## 2025-03-17 RX ORDER — PROMETHAZINE HYDROCHLORIDE 25 MG/1
25 SUPPOSITORY RECTAL ONCE AS NEEDED
Status: DISCONTINUED | OUTPATIENT
Start: 2025-03-17 | End: 2025-03-17 | Stop reason: HOSPADM

## 2025-03-17 RX ORDER — OXYCODONE HYDROCHLORIDE 5 MG/1
5 TABLET ORAL
Status: DISCONTINUED | OUTPATIENT
Start: 2025-03-17 | End: 2025-03-17 | Stop reason: HOSPADM

## 2025-03-17 RX ORDER — SODIUM CHLORIDE, SODIUM LACTATE, POTASSIUM CHLORIDE, CALCIUM CHLORIDE 600; 310; 30; 20 MG/100ML; MG/100ML; MG/100ML; MG/100ML
9 INJECTION, SOLUTION INTRAVENOUS CONTINUOUS PRN
Status: DISCONTINUED | OUTPATIENT
Start: 2025-03-17 | End: 2025-03-17 | Stop reason: HOSPADM

## 2025-03-17 RX ORDER — ONDANSETRON 2 MG/ML
4 INJECTION INTRAMUSCULAR; INTRAVENOUS ONCE AS NEEDED
Status: DISCONTINUED | OUTPATIENT
Start: 2025-03-17 | End: 2025-03-17 | Stop reason: HOSPADM

## 2025-03-17 RX ORDER — DEXAMETHASONE SODIUM PHOSPHATE 4 MG/ML
INJECTION, SOLUTION INTRA-ARTICULAR; INTRALESIONAL; INTRAMUSCULAR; INTRAVENOUS; SOFT TISSUE AS NEEDED
Status: DISCONTINUED | OUTPATIENT
Start: 2025-03-17 | End: 2025-03-17 | Stop reason: SURG

## 2025-03-17 RX ORDER — ONDANSETRON 2 MG/ML
INJECTION INTRAMUSCULAR; INTRAVENOUS AS NEEDED
Status: DISCONTINUED | OUTPATIENT
Start: 2025-03-17 | End: 2025-03-17 | Stop reason: SURG

## 2025-03-17 RX ORDER — MIDAZOLAM HYDROCHLORIDE 2 MG/2ML
2 INJECTION, SOLUTION INTRAMUSCULAR; INTRAVENOUS ONCE
Status: COMPLETED | OUTPATIENT
Start: 2025-03-17 | End: 2025-03-17

## 2025-03-17 RX ORDER — LIDOCAINE HYDROCHLORIDE 40 MG/ML
INJECTION, SOLUTION RETROBULBAR AS NEEDED
Status: DISCONTINUED | OUTPATIENT
Start: 2025-03-17 | End: 2025-03-17 | Stop reason: HOSPADM

## 2025-03-17 RX ORDER — PROPOFOL 10 MG/ML
VIAL (ML) INTRAVENOUS AS NEEDED
Status: DISCONTINUED | OUTPATIENT
Start: 2025-03-17 | End: 2025-03-17 | Stop reason: SURG

## 2025-03-17 RX ORDER — LIDOCAINE HYDROCHLORIDE 20 MG/ML
INJECTION, SOLUTION EPIDURAL; INFILTRATION; INTRACAUDAL; PERINEURAL AS NEEDED
Status: DISCONTINUED | OUTPATIENT
Start: 2025-03-17 | End: 2025-03-17 | Stop reason: SURG

## 2025-03-17 RX ADMIN — MIDAZOLAM HYDROCHLORIDE 2 MG: 1 INJECTION, SOLUTION INTRAMUSCULAR; INTRAVENOUS at 14:17

## 2025-03-17 RX ADMIN — PROPOFOL 30 MG: 10 INJECTION, EMULSION INTRAVENOUS at 14:38

## 2025-03-17 RX ADMIN — PROPOFOL 30 MG: 10 INJECTION, EMULSION INTRAVENOUS at 14:43

## 2025-03-17 RX ADMIN — ONDANSETRON 4 MG: 2 INJECTION INTRAMUSCULAR; INTRAVENOUS at 14:40

## 2025-03-17 RX ADMIN — SODIUM CHLORIDE, POTASSIUM CHLORIDE, SODIUM LACTATE AND CALCIUM CHLORIDE: 600; 310; 30; 20 INJECTION, SOLUTION INTRAVENOUS at 14:32

## 2025-03-17 RX ADMIN — LIDOCAINE HYDROCHLORIDE 40 MG: 20 INJECTION, SOLUTION EPIDURAL; INFILTRATION; INTRACAUDAL; PERINEURAL at 14:41

## 2025-03-17 RX ADMIN — PROPOFOL 30 MG: 10 INJECTION, EMULSION INTRAVENOUS at 14:32

## 2025-03-17 RX ADMIN — PROPOFOL 175 MCG/KG/MIN: 10 INJECTION, EMULSION INTRAVENOUS at 14:32

## 2025-03-17 RX ADMIN — DEXAMETHASONE SODIUM PHOSPHATE 4 MG: 4 INJECTION, SOLUTION INTRAMUSCULAR; INTRAVENOUS at 14:40

## 2025-03-17 RX ADMIN — LIDOCAINE HYDROCHLORIDE 40 MG: 20 INJECTION, SOLUTION EPIDURAL; INFILTRATION; INTRACAUDAL; PERINEURAL at 14:32

## 2025-03-17 NOTE — OP NOTE
Bronchoscopy Procedure Note    Procedure:  Bronchoscopy with bronchoalveolar lavage   Airway inspection with airway clearance of secretions.     Pre-Operative Diagnosis:  abnormal chest ct  Post-Operative Diagnosis: Same    Indication:  abnormal chest ct    Anesthesia: MAC anesthesia    Procedure Details: Patient was consented for the procedure with all risks and benefits of the procedure explained in detail. Patient was given the opportunity to ask questions and all concerns were answered.    The bronchoscope was inserted into the main airway via the mouth. An anatomical survey was done of the main airways and the subsegmental bronchus. The findings are reported below. A bronchoalveolar lavage was performed using 60 mL aliquots of normal saline instilled into the airways then aspirated back from RLL of lung with 30 mL serosanguineous fluid in return. A bronchoalveolar lavage was performed using 60 mL aliquots of normal saline instilled into the airways then aspirated back from LLL of lung with 30 mL serosanguineous fluid in return. Airway clearance of lung performed with suctioning and removal of secretions and mucous plugs.     Findings:  Inflamed bronchial mucosa with petechial, friable mucosa, easy bleeding with suction  Scattered purulent secretions    Estimated Blood Loss: 15 mL    Specimens:  BAL RLL and LLL  Bronchial washings tracheobronchial tree    Complications: None; patient tolerated the procedure well.    Disposition: To home    Patient tolerated the procedure well.    Electronically signed by Gigi Najera MD, 3/17/2025, 14:54 EDT.

## 2025-03-17 NOTE — ANESTHESIA PREPROCEDURE EVALUATION
Anesthesia Evaluation     Patient summary reviewed and Nursing notes reviewed   no history of anesthetic complications:   NPO Solid Status: > 8 hours  NPO Liquid Status: > 2 hours           Airway   Mallampati: II  TM distance: >3 FB  Neck ROM: full  No difficulty expected  Dental      Pulmonary - negative pulmonary ROS and normal exam    breath sounds clear to auscultation  Cardiovascular - normal exam  Exercise tolerance: good (4-7 METS)    Rhythm: regular  Rate: normal    (+) hypertension, hyperlipidemia      Neuro/Psych  (+) headaches, psychiatric history Depression  GI/Hepatic/Renal/Endo    (+) diabetes mellitus type 1    Musculoskeletal (-) negative ROS    Abdominal    Substance History - negative use     OB/GYN negative ob/gyn ROS         Other      history of cancer    ROS/Med Hx Other: PAT Nursing Notes unavailable.                     Anesthesia Plan    ASA 3     general     (Patient understands anesthesia not responsible for dental damage.    Impression and Plan:     1. Cavitary lesions of lung  2. Abnormal chest CT  3. Active tobacco use, 1 ppd >10+ yr  )  intravenous induction     Anesthetic plan, risks, benefits, and alternatives have been provided, discussed and informed consent has been obtained with: patient.  Pre-procedure education provided  Plan discussed with CRNA.        CODE STATUS:

## 2025-03-17 NOTE — ANESTHESIA POSTPROCEDURE EVALUATION
Patient: Shy Yates    Procedure Summary       Date: 03/17/25 Room / Location: [facility] OR 01 / [facility] MAIN OR    Anesthesia Start: 1431 Anesthesia Stop: 1505    Procedure: BRONCHOSCOPY: BAL, WASHINGS insertion of lighted instrument to view inside the lung (Bronchus) Diagnosis:       Lung nodules      (Lung nodules [R91.8])    Surgeons: Gigi Najera MD Provider: Yazan Thayer MD    Anesthesia Type: general ASA Status: 3            Anesthesia Type: general    Vitals  Vitals Value Taken Time   /72 03/17/25 15:25   Temp 36.6 °C (97.9 °F) 03/17/25 14:55   Pulse 72 03/17/25 15:35   Resp 16 03/17/25 15:25   SpO2 93 % 03/17/25 15:35   Vitals shown include unfiled device data.        Post Anesthesia Care and Evaluation    Patient location during evaluation: bedside  Patient participation: complete - patient participated  Level of consciousness: awake  Pain management: adequate    Airway patency: patent  PONV Status: none  Cardiovascular status: acceptable and stable  Respiratory status: acceptable  Hydration status: acceptable

## 2025-03-17 NOTE — DISCHARGE INSTRUCTIONS
DISCHARGE INSTRUCTIONS  BRONCHOSCOPY/  ENDOBRONCHIAL ULTRASOUND      For your procedure you had:  General Anesthesia (you may have a sore throat for the first 24 hours)    You may experience dizziness, drowsiness, or lightheadedness for several hours following surgery/procedure.  Do not stay alone today or tonight.  Limit your activity for 24 hours.  You should not drive or operate machinery or drink alcohol for 24 hours or while you are taking pain medication.  You should not sign legally binding documents.  Resume your diet slowly.  Follow any special dietary instructions you may have been given by your doctor.  NOTIFY YOUR DOCTOR IF YOU EXPERIENCE ANY OF THE FOLLOWING:  Temperature greater than 101 degrees Fahrenheit  Shaking Chills  Redness or excessive drainage from incision  Nausea, vomiting and/or pain that is not controlled by prescribed medications  Increase in bleeding or bleeding that is excessive  Unable to urinate in 6 hours after surgery  If unable to reach your doctor, please go to the closest Emergency Room     Following your bronchoscopy, you may experience a mild sore throat or cough up small amounts of blood.  Extremes of either should be reported to your doctor.  If you have sudden shortness of breath, sense of urgency, chest pain, chest pressure that worsens over time or sharp chest pains that worsen with deep breaths or coughs go the ED or call 911.  Smokers are encouraged not to smoke for 6 to 8 hours after the procedure to decrease the risk of coughing and bleeding.  Nausea and vomiting can occur, but is not a common side effect of the procedure you have had today. If you experience any nausea or vomiting, take clear liquids for your next meal.  Coughing (slight dry cough to hacking cough) is completely expected for 3 to 4 days.  Pink/ blood tinged sputum is expected for several days ( or while coughing)  Notify MD or go to the ED if significant blood is found in the sputum.  Missing your  appointment/follow-up could lead to serious complications.

## 2025-03-17 NOTE — INTERVAL H&P NOTE
H&P reviewed. The patient was examined and there are no changes to the H&P.      Risk and benefits explained to patient. Risk including bleeding, infection, pneumothorax, and even death can occur. Patient expressed understanding and would like to proceed.    Electronically signed by Gigi Najera MD, 03/17/25, 2:03 PM EDT.

## 2025-03-19 LAB
BACTERIA SPEC AEROBE CULT: NORMAL
BACTERIA SPEC AEROBE CULT: NORMAL
BACTERIA SPEC RESP CULT: NORMAL
CYTO UR: NORMAL
GRAM STN SPEC: NORMAL
LAB AP CASE REPORT: NORMAL
LAB AP CLINICAL INFORMATION: NORMAL
LAB AP SPECIAL STAINS: NORMAL
PATH REPORT.FINAL DX SPEC: NORMAL
PATH REPORT.GROSS SPEC: NORMAL

## 2025-03-20 LAB
REF LAB TEST METHOD: NORMAL
REF LAB TEST METHOD: NORMAL

## 2025-03-26 LAB
FUNGUS WND CULT: ABNORMAL
VIRUS SPEC CULT: NORMAL
VIRUS SPEC CULT: NORMAL

## 2025-03-28 ENCOUNTER — OFFICE VISIT (OUTPATIENT)
Dept: OBSTETRICS AND GYNECOLOGY | Age: 70
End: 2025-03-28
Payer: MEDICARE

## 2025-03-28 VITALS
WEIGHT: 155 LBS | BODY MASS INDEX: 23.49 KG/M2 | HEIGHT: 68 IN | DIASTOLIC BLOOD PRESSURE: 72 MMHG | SYSTOLIC BLOOD PRESSURE: 126 MMHG

## 2025-03-28 DIAGNOSIS — Z72.0 TOBACCO ABUSE: ICD-10-CM

## 2025-03-28 DIAGNOSIS — Z11.51 SCREENING FOR HPV (HUMAN PAPILLOMAVIRUS): ICD-10-CM

## 2025-03-28 DIAGNOSIS — N89.8 VAGINAL ITCHING: ICD-10-CM

## 2025-03-28 DIAGNOSIS — Z12.4 SCREENING FOR MALIGNANT NEOPLASM OF THE CERVIX: ICD-10-CM

## 2025-03-28 DIAGNOSIS — Z01.419 WELL WOMAN EXAM WITH ROUTINE GYNECOLOGICAL EXAM: Primary | ICD-10-CM

## 2025-03-28 RX ORDER — NYSTATIN AND TRIAMCINOLONE ACETONIDE 100000; 1 [USP'U]/G; MG/G
1 OINTMENT TOPICAL DAILY
Qty: 30 G | Refills: 0 | Status: SHIPPED | OUTPATIENT
Start: 2025-03-28

## 2025-03-28 NOTE — PROGRESS NOTES
"Subjective     History of Present Illness    Chief Complaint   Patient presents with    Gynecologic Exam     Ae- last pap 3/1/2024 Neg, Hpv Neg, mammo 2024, dexa 9/3/2024, colonoscopy 2019 c/o itching       Shy Yates is a 70 y.o. female  who presents for annual exam.  C/o vaginal itching x 3 weeks.  No vaginal discharge.  Not sexually active.  No new soaps or detergents.  She is a diabetic, recent A1c was around 7.4%.   No PMB.  No urinary incontinence symptoms.  Had a bronchoscopy recently, states it was normal.  Current smoker, 1 pack/day.  Continued pap smears per Dr. Mohan's notes, \"She has a history of cervical dysplasia and tobacco use, needs continued testing for HPV\".   Screening mammogram utd, negative 2024.   DEXA utd, osteopenia 9/3/2024.  Colonoscopy from 2019, states she may have had polyps and was due to be completed last year but could not finish the prep.  Her PCP is aware she will no longer complete colonoscopies.  Follows with PCP.  Social - she may have move back to [place], has moe Christian.    Relevant data reviewed:  Mammo Screening Digital Tomosynthesis Bilateral With CAD (2024 14:34)   DEXA Bone Density Axial (2024 14:15)     Obstetric History:  OB History          5    Para   4    Term   4            AB   1    Living   4         SAB   1    IAB        Ectopic        Molar        Multiple        Live Births   4               Menstrual History:     No LMP recorded (lmp unknown). Patient is postmenopausal.           Current contraception: post menopausal status  History of abnormal Pap smear: yes - hx cervical dysplasia  Received Gardasil immunization: no  Perform regular self breast exam: yes -    Family history of uterine or ovarian cancer: no  Family History of colon cancer: no  Family history of breast cancer: yes - sister 41 y/o    PAP: done today  Mammogram: ordered  Colonoscopy: recommended   DEXA: up to date - " "osteopenia in 2024    Exercise: not active  Calcium/Vitamin D: adequate intake and uses supplements    The following portions of the patient's history were reviewed and updated as appropriate: allergies, current medications, past family history, past medical history, past social history, past surgical history, and problem list.    Review of Systems  Pertinent items are noted in HPI.       Objective   Physical Exam    /72   Ht 172.7 cm (68\")   Wt 70.3 kg (155 lb)   LMP  (LMP Unknown)   BMI 23.57 kg/m²      General: alert, appears stated age, cooperative, and no distress   Heart: regular rate and rhythm, S1, S2 normal, no murmur, click, rub or gallop   Lungs: clear to auscultation bilaterally   Abdomen: soft, non-tender, without masses or organomegaly   Breast: inspection negative, no nipple discharge or bleeding, no masses or nodularity palpable   External genitalia/Vulva: Erythematous labias with curdlike discharge, External genitalia including bartholin's glands, Urethra, [place]'s gland and urethra meatus are normal, and Bladder appears normal without significant prolapse    Vagina: normal mucosa, normal discharge   Cervix: no lesions   Uterus: normal size and non-tender   Adnexa: normal adnexa and no mass, fullness, tenderness   Neurologic: Alert and Oriented x3   Psychiatric: Normal affect, judgement, and mood       Assessment & Plan   Diagnoses and all orders for this visit:    1. Well woman exam with routine gynecological exam (Primary)  -     IGP, Apt HPV,rfx 16 / 18,45    2. Screening for HPV (human papillomavirus)  -     IGP, Apt HPV,rfx 16 / 18,45    3. Screening for malignant neoplasm of the cervix  -     IGP, Apt HPV,rfx 16 / 18,45    4. Vaginal itching  -     NuSwab BV & Candida - Swab, Vagina  -     nystatin-triamcinolone (MYCOLOG) 949865-1.1 UNIT/GM-% ointment; Apply 1 Application topically to the appropriate area as directed Daily. For 7 days.  Dispense: 30 g; Refill: 0    5. Tobacco " abuse          PAP smear with HPV co-testing completed today  NuSwab for yeast and BV completed today  Mycolog vaginal ointment sent to pharmacy for suspected yeast infection.  Will call patient with results and treat accordingly.   All questions answered.  Breast self exam technique reviewed and patient encouraged to perform self-exam monthly.  Physical activity and regular exercise encouraged.  Discussed healthy lifestyle modifications.  Discussed calcium and vitamin D needs to prevent osteoporosis.  Smoking cessation discussed  Screening mammogram order already placed for November 2025, will call patient with results.    Return in about 1 year (around 3/28/2026) for Annual Exam - medicare high risk .       Amina Christian PA-C  3/28/2025 13:53 EDT

## 2025-03-31 LAB
A VAGINAE DNA VAG QL NAA+PROBE: ABNORMAL SCORE
BVAB2 DNA VAG QL NAA+PROBE: ABNORMAL SCORE
C ALBICANS DNA VAG QL NAA+PROBE: POSITIVE
C GLABRATA DNA VAG QL NAA+PROBE: NEGATIVE
MEGA1 DNA VAG QL NAA+PROBE: ABNORMAL SCORE

## 2025-04-02 LAB
CYTOLOGIST CVX/VAG CYTO: NORMAL
CYTOLOGY CVX/VAG DOC CYTO: NORMAL
CYTOLOGY CVX/VAG DOC THIN PREP: NORMAL
DX ICD CODE: NORMAL
HPV I/H RISK 4 DNA CVX QL PROBE+SIG AMP: NEGATIVE
OTHER STN SPEC: NORMAL
SERVICE CMNT-IMP: NORMAL
STAT OF ADQ CVX/VAG CYTO-IMP: NORMAL

## 2025-04-16 LAB
FUNGUS WND CULT: ABNORMAL

## 2025-04-28 LAB
MYCOBACTERIUM SPEC CULT: NORMAL
NIGHT BLUE STAIN TISS: NORMAL

## 2025-05-05 ENCOUNTER — TRANSCRIBE ORDERS (OUTPATIENT)
Dept: FAMILY MEDICINE CLINIC | Facility: CLINIC | Age: 70
End: 2025-05-05
Payer: MEDICARE

## 2025-05-06 ENCOUNTER — TRANSCRIBE ORDERS (OUTPATIENT)
Dept: FAMILY MEDICINE CLINIC | Facility: CLINIC | Age: 70
End: 2025-05-06
Payer: MEDICARE

## 2025-05-06 DIAGNOSIS — F33.9 EPISODE OF RECURRENT MAJOR DEPRESSIVE DISORDER, UNSPECIFIED DEPRESSION EPISODE SEVERITY: Primary | ICD-10-CM

## 2025-05-13 ENCOUNTER — OFFICE VISIT (OUTPATIENT)
Age: 70
End: 2025-05-13
Payer: MEDICARE

## 2025-05-13 VITALS
HEART RATE: 94 BPM | WEIGHT: 152 LBS | DIASTOLIC BLOOD PRESSURE: 70 MMHG | BODY MASS INDEX: 23.04 KG/M2 | HEIGHT: 68 IN | OXYGEN SATURATION: 95 % | SYSTOLIC BLOOD PRESSURE: 116 MMHG

## 2025-05-13 DIAGNOSIS — F41.1 GENERALIZED ANXIETY DISORDER: ICD-10-CM

## 2025-05-13 DIAGNOSIS — F33.1 MODERATE EPISODE OF RECURRENT MAJOR DEPRESSIVE DISORDER: Primary | ICD-10-CM

## 2025-05-13 PROCEDURE — 90792 PSYCH DIAG EVAL W/MED SRVCS: CPT | Performed by: NURSE PRACTITIONER

## 2025-05-13 PROCEDURE — 1159F MED LIST DOCD IN RCRD: CPT | Performed by: NURSE PRACTITIONER

## 2025-05-13 PROCEDURE — 3078F DIAST BP <80 MM HG: CPT | Performed by: NURSE PRACTITIONER

## 2025-05-13 PROCEDURE — 3074F SYST BP LT 130 MM HG: CPT | Performed by: NURSE PRACTITIONER

## 2025-05-13 PROCEDURE — 1160F RVW MEDS BY RX/DR IN RCRD: CPT | Performed by: NURSE PRACTITIONER

## 2025-05-13 RX ORDER — SUVOREXANT 20 MG/1
1 TABLET, FILM COATED ORAL
COMMUNITY
Start: 2025-05-07

## 2025-05-13 NOTE — PROGRESS NOTES
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    Klebsiella aerogenes PCR 03/17/2025 Not Detected  Not Detected Final    Moraxella catarrhalis PCR 03/17/2025 Not Detected  Not Detected Final    Proteus species PCR 03/17/2025 Not Detected  Not Detected Final    Pseudomonas aeroginosa PCR 03/17/2025 Not Detected  Not Detected Final    Serratia marcescens PCR 03/17/2025 Not Detected  Not Detected Final    Staphylococcus aureus PCR 03/17/2025 Not Detected  Not Detected Final    Streptococcus pyogenes PCR 03/17/2025 Not Detected  Not Detected Final    Haemophilus influenzae PCR 03/17/2025 Not Detected  Not Detected Final    Streptococcus agalactiae PCR 03/17/2025 Detected (A)  Not Detected Final    10^4 Bin copies/mL    Streptococcus pneumoniae PCR 03/17/2025 Not Detected  Not Detected Final    Chlamydophila pneumoniae PCR 03/17/2025 Not Detected  Not Detected Final    Legionella pneumophilia PCR 03/17/2025 Not Detected  Not Detected Final    Mycoplasma pneumo by PCR 03/17/2025 Not Detected  Not Detected Final    ADENOVIRUS, PCR 03/17/2025  Not Detected  Not Detected Final    CTX-M Gene 03/17/2025 N/A  Not Detected, N/A Final    IMP Gene 03/17/2025 N/A  Not Detected, N/A Final    KPC Gene 03/17/2025 N/A  Not Detected, N/A Final    mecA/C and MREJ Gene 03/17/2025 N/A  Not Detected, N/A Final    NDM Gene 03/17/2025 N/A  Not Detected, N/A Final    OXA-48-like Gene 03/17/2025 N/A  Not Detected, N/A Final    VIM Gene 03/17/2025 N/A  Not Detected, N/A Final    Coronavirus 03/17/2025 Not Detected  Not Detected Final    Human Metapneumovirus 03/17/2025 Not Detected  Not Detected Final    Human Rhinovirus/Enterovirus 03/17/2025 Not Detected  Not Detected Final    Influenza A PCR 03/17/2025 Not Detected  Not Detected Final    Influenza B PCR 03/17/2025 Not Detected  Not Detected Final    RSV, PCR 03/17/2025 Not Detected  Not Detected Final    Parainfluenza virus PCR 03/17/2025 Not Detected  Not Detected Final    Lymphocytes, Fluid % 03/17/2025 66  % Final    Macrophage, Fluid % 03/17/2025 3  % Final    Bronchial Lining Cells 03/17/2025 31  % Final    Fungus Culture 03/17/2025 Moderate growth (3+) Candida albicans (A)   Final    AFB Culture 03/17/2025 No AFB isolated at 6 weeks   Final    AFB Stain 03/17/2025 No acid fast bacilli seen on direct smear   Final    AFB Stain 03/17/2025 No acid fast bacilli seen on concentrated smear   Final    Respiratory Culture 03/17/2025 Light growth (2+) Normal respiratory kathrine. No S. aureus or Pseudomonas aeruginosa detected. Final report.   Final    Gram Stain 03/17/2025 Occasional Gram positive cocci in pairs   Final    Gram Stain 03/17/2025 Occasional Gram negative bacilli, tiny   Final   Lab on 02/04/2025   Component Date Value Ref Range Status    Hemoglobin A1C 02/04/2025 7.70 (H)  4.80 - 5.60 % Final     EKG Results:  No orders to display    Imaging Results:  CT Chest Low Dose Cancer Screening WO  Result Date: 3/1/2025  Impression: Low-dose screening CT scan of the chest demonstrating numerous subcentimeter cavitary  lesions. The differential diagnosis includes infectious disease, inflammatory processes, and metastatic disease. Pulmonary consultation is recommended. Recommendation: 3 month follow up with LDCT.  PET/CT may be used if there is a =8 mm (=268 mm3) solid nodule or solid component Lung Rads Assessment: Lung-RADS L4A - Suspicious, 5-15% chance of malignancy. Electronically Signed: Roly Neal MD  3/1/2025 4:04 PM EST  Workstation ID: [id number]    ASSESSMENT AND PLAN:    ICD-10-CM ICD-9-CM   1. Moderate episode of recurrent major depressive disorder  F33.1 296.32   2. Generalized anxiety disorder  F41.1 300.02       70 y.o. female who presents today for initial evaluation regarding MDD and JUAN C. We have discussed the history and interpreted diagnoses as above as well as the treatment plan below, including potential of risk/benefits/side effects of the recommended regimen of which the patient demonstrates understanding. Patient is agreeable to call 911 or go to the nearest ER should she become concerned for her own safety and/or the safety of those around her. There are no overt indices of acute odette/psychosis on evaluation today.     Medication regimen: start cariprazine 1.5 mg qd, continue vortioxetine 10 md qd; patient is advised not to misuse prescribed medications or to use any exogenous substances that aren't disclosed to this provider as they may interact with the regimen to her detriment. Patient is agreeable to plan.  Risk Assessment: protracted risk is low, imminent risk is low. Risk factors include: anxiety disorder, mood disorder, and recent/ongoing psychosocial stressors. Protective factors include: intact reality testing, no substance use disorder, no access to firearms, no present SI, no stated history of suicide attempts or self-harm, patient's exhibited future-orientation, and patient's cooperation with care. Do note that this is subject to change with the Buddhist of new stressors, treatment  non-adherence, use of substances, and/or new medical ails.  Monitoring: reviewed labs/imaging as populated above, no labs ordered; PHQ-9 today is 10/27, JUAN C-7 today is 4/21  Therapy: [facility] referral - patient prefers [place] location  Follow-up: Return in about 4 weeks (around 6/10/2025).  Treatment plan: due 08/14/25, completed 05/14/25  Communications: N/A    TREATMENT PLAN/GOALS: challenge patterns of living conducive to symptom burden, implement recommended regimen as above with augmentative, intermittent supportive psychotherapy to reduce symptom burden. Patient acknowledged and verbally consented to begin treatment as above. The importance of adherence to the recommended treatment and interval follow-up appointments was emphasized today. Patient was today advised to limit daily caffeine intake, hydrate appropriately, eat healthy and nutritious foods, engage sleep hygiene measures, engage appropriate exposure to sunlight, engage with hobbies in balance with life necessities, and exercise appropriate to their capacity to do so.     Billing: I have seen the patient today and considered her psychiatric complaints, rendered a diagnosis, and discussed treatment with the patient as above with which she consents.    Parts of this note are electronic transcriptions/translations of spoken language to printed text using the Dragon Dictation system.    Electronically signed by JOSEFINA Mohr, 05/13/25, [id number] EDT

## 2025-05-14 PROBLEM — F41.1 GENERALIZED ANXIETY DISORDER: Status: ACTIVE | Noted: 2025-05-14

## 2025-05-14 NOTE — TREATMENT PLAN
Multi-Disciplinary Problems (from Behavioral Health Treatment Plan)      Active Problems       Problem: Depression  Start Date: 05/14/25      Problem Details: The patient self-scales this problem as a 5 with 10 being the worst.          Goal Priority Start Date Expected End Date End Date    Patient will demonstrate the ability to initiate new constructive life skills outside of sessions on a consistent basis. -- 05/14/25 11/12/25 --    Goal Details: Progress toward goal:  Not appropriate to rate progress toward goal since this is the initial treatment plan.        Goal Intervention Frequency Start Date End Date    Assist patient in setting attainable activities of daily living goals. Q3 Months 05/14/25 --      Goal Intervention Frequency Start Date End Date    Provide education about depression Q3 Months 05/14/25 --    Intervention Details: Duration of treatment until remission of symptoms.        Goal Intervention Frequency Start Date End Date    Assist patient in developing healthy coping strategies. Q3 Months 05/14/25 --    Intervention Details: Duration of treatment until remission of symptoms.                               I have discussed and reviewed this treatment plan with the patient.

## 2025-05-27 ENCOUNTER — HOSPITAL ENCOUNTER (OUTPATIENT)
Dept: CT IMAGING | Facility: HOSPITAL | Age: 70
Discharge: HOME | End: 2025-05-27
Admitting: STUDENT IN AN ORGANIZED HEALTH CARE EDUCATION/TRAINING PROGRAM
Payer: MEDICARE

## 2025-05-27 DIAGNOSIS — R91.8 LUNG NODULES: ICD-10-CM

## 2025-05-27 PROCEDURE — 71250 CT THORAX DX C-: CPT

## 2025-06-12 ENCOUNTER — OFFICE VISIT (OUTPATIENT)
Age: 70
End: 2025-06-12
Payer: MEDICARE

## 2025-06-12 VITALS
HEART RATE: 97 BPM | SYSTOLIC BLOOD PRESSURE: 130 MMHG | OXYGEN SATURATION: 95 % | DIASTOLIC BLOOD PRESSURE: 70 MMHG | WEIGHT: 151.5 LBS | BODY MASS INDEX: 23.04 KG/M2

## 2025-06-12 DIAGNOSIS — F33.1 MODERATE EPISODE OF RECURRENT MAJOR DEPRESSIVE DISORDER: Primary | ICD-10-CM

## 2025-06-12 DIAGNOSIS — F41.1 GENERALIZED ANXIETY DISORDER: ICD-10-CM

## 2025-06-12 NOTE — PROGRESS NOTES
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  Not Detected Final    Influenza B PCR 03/17/2025 Not Detected  Not Detected Final    RSV, PCR 03/17/2025 Not Detected  Not Detected Final    Parainfluenza virus PCR 03/17/2025 Not Detected  Not Detected Final    Lymphocytes, Fluid % 03/17/2025 66  % Final    Macrophage, Fluid % 03/17/2025 3  % Final    Bronchial Lining Cells 03/17/2025 31  % Final    Fungus Culture 03/17/2025 Moderate growth (3+) Candida albicans (A)   Final    AFB Culture 03/17/2025 No AFB isolated at 6 weeks   Final    AFB Stain 03/17/2025 No acid fast bacilli seen on direct smear   Final    AFB Stain 03/17/2025 No acid fast bacilli seen on concentrated smear   Final    Respiratory Culture 03/17/2025 Light growth (2+) Normal respiratory kathrine. No S. aureus or Pseudomonas aeruginosa detected. Final report.   Final    Gram Stain 03/17/2025 Occasional Gram positive cocci in pairs   Final    Gram Stain 03/17/2025 Occasional Gram negative bacilli, tiny   Final   Lab on 02/04/2025   Component Date Value Ref Range Status    Hemoglobin A1C 02/04/2025 7.70 (H)  4.80 - 5.60 % Final     EKG Results:  No orders to display    Imaging  Results:  CT Chest Without Contrast Diagnostic  Result Date: 6/1/2025  Impression: 1. Improving bilateral cavitary lesions in the lung fields favoring infectious or inflammatory process. Recommend a follow-up CT scan of the chest in 3 months. 2. Coronary artery calcification. Electronically Signed: Rajan Rosa MD  6/1/2025 5:11 PM EDT  Workstation ID: [id number]    CT Chest Low Dose Cancer Screening WO  Result Date: 3/1/2025  Impression: Low-dose screening CT scan of the chest demonstrating numerous subcentimeter cavitary lesions. The differential diagnosis includes infectious disease, inflammatory processes, and metastatic disease. Pulmonary consultation is recommended. Recommendation: 3 month follow up with LDCT.  PET/CT may be used if there is a =8 mm (=268 mm3) solid nodule or solid component Lung Rads Assessment: Lung-RADS L4A - Suspicious, 5-15% chance of malignancy. Electronically Signed: Roly Neal MD  3/1/2025 4:04 PM EST  Workstation ID: [id number]    ASSESSMENT AND PLAN:    ICD-10-CM ICD-9-CM   1. Moderate episode of recurrent major depressive disorder  F33.1 296.32   2. Generalized anxiety disorder  F41.1 300.02       70 y.o. female who presents today for follow up regarding MDD and JUAN C. We have discussed the history and interpreted diagnoses as above as well as the treatment plan below, including potential of risk/benefits/side effects of the recommended regimen of which the patient demonstrates understanding. Patient is agreeable to call 911 or go to the nearest ER should she become concerned for her own safety and/or the safety of those around her. There are no overt changes on exam today compared to most recent evaluation.    Medication regimen: increase cariprazine 3 mg qd, continue vortioxetine 10 mg qd; patient is advised not to misuse prescribed medications or to use any exogenous substances that aren't disclosed to this provider as they may interact with the regimen to her detriment. Patient  is agreeable to plan.  Risk Assessment: protracted risk is low, imminent risk is low. Risk factors include: anxiety disorder, mood disorder, and recent/ongoing psychosocial stressors. Protective factors include: intact reality testing, no substance use disorder, no access to firearms, no present SI, no stated history of suicide attempts or self-harm, patient's exhibited future-orientation, and patient's cooperation with care. Do note that this is subject to change with the Druze of new stressors, treatment non-adherence, use of substances, and/or new medical ails.  Monitoring: reviewed labs/imaging as populated above, no labs ordered  Therapy: referred to Gordon Gonzalez  Follow-up: Return in about 4 weeks (around 7/10/2025).  Treatment plan: due 08/14/25, completed 05/14/25  Communications: N/A    TREATMENT PLAN/GOALS: challenge patterns of living conducive to symptom burden, implement recommended regimen as above with augmentative, intermittent supportive psychotherapy to reduce symptom burden. Patient acknowledged and verbally consented to continue treatment. The importance of adherence to the recommended treatment and interval follow-up appointments was again emphasized today: patient has good treatment adherence per given history. Patient was today reminded to limit daily caffeine intake, hydrate appropriately, eat healthy and nutritious foods, engage sleep hygiene measures, engage appropriate exposure to sunlight, engage with hobbies in balance with life necessities, and exercise appropriate to their capacity to do so.    Billing: Additionally, I provided 10 minutes of dedicated psychotherapy to the patient, distinct from E/M services, as documented above. Start time: 1425. Stop time: 1435.    Parts of this note are electronic transcriptions/translations of spoken language to printed text using the Dragon Dictation system.    Electronically signed by JOSEFINA Mohr, 06/12/25, 1471 EDT  Answers submitted by the  patient for this visit:  Depression (Submitted on 6/7/2025)  Chief Complaint: Depression  Visit: follow-up  Frequency: constantly  Severity: moderate  excessive worry: Yes  insomnia: Yes  irritability: No  malaise/fatigue: Yes  obsessions: No  hypersomnia: No  difficulty controlling mood: No  difficulty staying asleep: No  difficulty falling asleep: Yes  Hours of sleep per night: 4 Hours  Medication compliance: %  Aggravated by: family issues, social activities

## 2025-06-21 DIAGNOSIS — N89.8 VAGINAL ITCHING: ICD-10-CM

## 2025-06-23 RX ORDER — NYSTATIN AND TRIAMCINOLONE ACETONIDE 100000; 1 [USP'U]/G; MG/G
1 OINTMENT TOPICAL DAILY
Qty: 30 G | Refills: 0 | Status: SHIPPED | OUTPATIENT
Start: 2025-06-23

## 2025-06-24 ENCOUNTER — OFFICE VISIT (OUTPATIENT)
Dept: PULMONOLOGY | Facility: CLINIC | Age: 70
End: 2025-06-24
Payer: MEDICARE

## 2025-06-24 VITALS
WEIGHT: 152 LBS | DIASTOLIC BLOOD PRESSURE: 72 MMHG | TEMPERATURE: 98 F | OXYGEN SATURATION: 97 % | RESPIRATION RATE: 14 BRPM | BODY MASS INDEX: 23.04 KG/M2 | HEART RATE: 89 BPM | HEIGHT: 68 IN | SYSTOLIC BLOOD PRESSURE: 138 MMHG

## 2025-06-24 DIAGNOSIS — R91.8 LUNG NODULES: Primary | ICD-10-CM

## 2025-06-24 DIAGNOSIS — R91.8 ABNORMAL CT SCAN OF LUNG: ICD-10-CM

## 2025-06-24 DIAGNOSIS — Z72.0 TOBACCO USE: ICD-10-CM

## 2025-06-24 PROCEDURE — 3075F SYST BP GE 130 - 139MM HG: CPT | Performed by: STUDENT IN AN ORGANIZED HEALTH CARE EDUCATION/TRAINING PROGRAM

## 2025-06-24 PROCEDURE — 3078F DIAST BP <80 MM HG: CPT | Performed by: STUDENT IN AN ORGANIZED HEALTH CARE EDUCATION/TRAINING PROGRAM

## 2025-06-24 PROCEDURE — 99214 OFFICE O/P EST MOD 30 MIN: CPT | Performed by: STUDENT IN AN ORGANIZED HEALTH CARE EDUCATION/TRAINING PROGRAM

## 2025-06-24 RX ORDER — CITALOPRAM HYDROBROMIDE 10 MG/1
10 TABLET ORAL DAILY
COMMUNITY
Start: 2025-03-16

## 2025-06-24 RX ORDER — TEMAZEPAM 7.5 MG/1
7.5 CAPSULE ORAL NIGHTLY PRN
COMMUNITY
Start: 2025-06-18

## 2025-06-24 NOTE — PROGRESS NOTES
Primary Care Provider  Milton Richards MD   Referring Provider  No ref. provider found      Patient Complaint  Follow-up (Bronch results) and tobacco use      Subjective          Shy Yates presents to [facility]      History of Presenting Illness  Shy Yates is a 70 y.o. female with cavitary lung nodules here for follow-up.    Patient had bronchoscopy with BAL done which did not show any organisms other than Candida  She remains largely asymptomatic from a pulmonary standpoint  No fever, chills, hemoptysis  No sputum production no abnormal weight loss  She continues to smoke 1 pack/day  Repeat chest CT showed improvement of her cavitary lung nodules    Review of Systems  Constitutional:  No fever. No chills. No weakness.  Eyes: No pain, erythema, or discharge. No blurring of vision.  ENT:  No sore throat, URI symptoms.   Cardiovascular:  No chest pain. No palpitations. No lower extremity edema.  Respiratory:  No shortness of breath, +cough, pleuritic chest pain. No hemoptysis. No dyspnea.   GI:  Normal appetite. No nausea, vomiting, diarrhea. No pain. No melena.  Musculoskeletal:  No arthralgias or myalgias.  Neurologic:  No headache. No weakness.    Family History   Problem Relation Age of Onset    Stroke Father     Diabetes type II Father     Kidney cancer Father     Diabetes Father     No Known Problems Mother          @ 93.    Rectal cancer Brother         Late 50's    Breast cancer Sister 40    No Known Problems Son     Diabetes type II Daughter         IDDM    Stroke Paternal Grandfather 90    Diabetes Paternal Grandmother     Heart failure Paternal Grandmother     Hypertension Maternal Grandmother     Diabetes Maternal Grandmother     Hypertension Maternal Grandfather     Diabetes Maternal Grandfather     Stroke Maternal Grandfather     No Known Problems Maternal Aunt         90's    No Known Problems Maternal Aunt         90's     "No Known Problems Paternal Aunt         80's    No Known Problems Paternal Aunt         93 yoa    BRCA 1/2 Neg Hx     Colon cancer Neg Hx     Endometrial cancer Neg Hx     Ovarian cancer Neg Hx     Malig Hyperthermia Neg Hx         Social History     Socioeconomic History    Marital status:     Number of children: 4   Tobacco Use    Smoking status: Every Day     Current packs/day: 1.00     Average packs/day: 0.8 packs/day for 16.4 years (12.7 ttl pk-yrs)     Types: Cigarettes     Start date: 2014     Passive exposure: Current    Smokeless tobacco: Never    Tobacco comments:     I never smoked while pregnant   Vaping Use    Vaping status: Never Used   Substance and Sexual Activity    Alcohol use: Yes     Alcohol/week: 3.0 standard drinks of alcohol     Types: 3 Drinks containing 0.5 oz of alcohol per week     Comment: rare    Drug use: Yes     Frequency: 7.0 times per week     Types: Marijuana     Comment: Daily    Sexual activity: Not Currently     Partners: Male     Birth control/protection: Post-menopausal     Comment: sig other         Past Medical History:   Diagnosis Date    Abnormal mammogram of left breast     Anemia     Anxiety     Brain bleed     Had a brain bleed as a child and then again as an adult in . Neurologist, Dr Miguel A Lynn.    Chorea     Colon polyps 2007    Found at her 1st colonoscopy at age 52.    Depression       2 yr ago.    Encounter for screening for malignant neoplasm of colon 2019    Genital warts 2018:  Extensive on both labia majora and minora, perineum and perianally.  Treated multiple times with podophyllin or BCA or TCA.  May 21, 2019: All have totally resolved.    History of Papanicolaou smear of cervix     Pap smear Hx: \"Never had abnormal Paps\" before I saw her.  , neg pap (atrophic pattern, predominantly parabasal cells) , neg HR-HPV.  Dec, 2017, LGSIL, negative HR-HPV.  Attempted to contact patient about this " until we were able to get her in for a colposcopy on 2018.     History of Papanicolaou smear of cervix 2018: Colposcopy shows multiple flat raised condylomata on both labia majora and a row of micro-condyloma in between the right labia minora and majora.  Several in the anterior commissure.  Several at the anus.  Colposcopy showed white epithelium at 12 and 6:00, biopsied= ECC negative, biopsies at 6 and 12 show LGSIL.  Repeat Pap at that time= 2018, LGSIL, positive HR-HPV.    HPV (human papilloma virus) infection 2018    Hyperlipemia     On Zocor for 6 months.    Hypertension     Long Hx     IDDM (insulin dependent diabetes mellitus)     IDDM: After meals. Last Hgb A1c= 7.5.     Insomnia     Started melatonin last couple of nights. Doing better.    LGSIL on Pap smear of cervix 2018    See note of history of Pap smear, 2018    Menopause     Migraine headache     No Aura. Resolved.    Molluscum contagiosum 2018    Pain in left hand     Carpel tunnel syndrome.    Spinal headache     After     Varicella     Chicken pox as a child     (vaginal birth after )         Immunization History   Administered Date(s) Administered    Pneumococcal Conjugate 13-Valent (PCV13) 2016    Pneumococcal Polysaccharide (PPSV23) 2022    Zoster, Unspecified 2022, 2022       Allergies   Allergen Reactions    Aspirin Itching    Penicillins Itching          Current Outpatient Medications:     Accu-Chek FastClix Lancets [facility], USE AS DIRECTED FIVE TIMES DAILY., Disp: , Rfl:     Blood Glucose Monitoring Suppl (ACCU-CHEK ASPEN PLUS) w/Device kit, 4 (Four) Times a Day., Disp: , Rfl: 0    Cariprazine HCl (Vraylar) 3 MG capsule capsule, Take 1 capsule by mouth Daily for 90 days., Disp: 90 capsule, Rfl: 0    CeQur Simplicity 2U device, ONE EVERY 3-4 DAYS, Disp: , Rfl:     Cholecalciferol 25 MCG (1000 UT) tablet, Take 1 tablet by mouth Daily., Disp: , Rfl:      "citalopram (CeleXA) 10 MG tablet, Take 1 tablet by mouth Daily., Disp: , Rfl:     fenofibrate (TRICOR) 145 MG tablet, Take 1 tablet by mouth Daily., Disp: , Rfl:     glucose blood test strip, Use as instructed, Disp: 120 each, Rfl: 12    glucose monitor monitoring kit, 1 each 4 (Four) Times a Day., Disp: 1 each, Rfl: 0    HumaLOG 100 UNIT/ML injection, INJECT 100 UNITS VIA PUMP EVERY DAY, Disp: , Rfl:     lamoTRIgine (LaMICtal) 100 MG tablet, Take 1 tablet by mouth Daily., Disp: , Rfl:     Lantus SoloStar 100 UNIT/ML injection pen, ADMINISTER 20 TO 50 UNITS UNDER THE SKIN EVERY DAY, Disp: , Rfl:     lisinopril-hydrochlorothiazide (PRINZIDE,ZESTORETIC) 20-12.5 MG per tablet, , Disp: , Rfl:     nystatin-triamcinolone (MYCOLOG) 081242-6.1 UNIT/GM-% ointment, Apply 1 Application topically to the appropriate area as directed Daily. For 7 days., Disp: 30 g, Rfl: 0    rosuvastatin (CRESTOR) 20 MG tablet, Take 1 tablet by mouth Daily., Disp: , Rfl:     temazepam (RESTORIL) 15 MG capsule, Take 1 capsule by mouth every night at bedtime., Disp: , Rfl:     topiramate (TOPAMAX) 25 MG tablet, Take 1 tablet by mouth., Disp: , Rfl:     vitamin C (ASCORBIC ACID) 250 MG tablet, Take 1 tablet by mouth Daily., Disp: , Rfl:     Vortioxetine HBr (Trintellix) 10 MG tablet tablet, Take 1 tablet by mouth Daily With Breakfast for 90 days., Disp: 30 tablet, Rfl: 2    Xigduo XR 5-1000 MG tablet, Take 2 tablets by mouth Daily., Disp: , Rfl:     temazepam (RESTORIL) 7.5 MG capsule, Take 1 capsule by mouth At Night As Needed. (Patient not taking: Reported on 6/24/2025), Disp: , Rfl:      Objective     Vital Signs:   /72 (BP Location: Right arm, Patient Position: Sitting, Cuff Size: Adult)   Pulse 89   Temp 98 °F (36.7 °C) (Tympanic)   Resp 14   Ht 172.7 cm (68\")   Wt 68.9 kg (152 lb)   SpO2 97% Comment: room air  BMI 23.11 kg/m²     Physical Exam  Vitals:    06/24/25 1355   BP: 138/72   Pulse: 89   Resp: 14   Temp: 98 °F (36.7 °C) "   SpO2: 97%       General: Alert, NAD  HEENT:  EOMI, no sinus tenderness  Neck:  Supple, no thyromegaly,  no JVD  Lymph: no cervical, supraclavicular lymphadenopathy bilaterally  Chest:  clear to auscultation bilaterally, no wheezing or crackles; no work of breathing noted  CV: RRR, no M/G/R, pulses 2+, equal.  Abd:  Soft, NT, ND, +BS  EXT:  no clubbing, no cyanosis, no edema b/l  Neuro:  A&Ox3, CN grossly intact, no focal deficits  Skin: No rashes or lesions noted       Result Review :   I have personally reviewed patient's labs and images.          Assessment and Plan      Patient Active Problem List   Diagnosis    Vulvovaginitis due to Candida    IDDM (insulin dependent diabetes mellitus)    Cervical dysplasia, mild    Insomnia    Hypertension    Hyperlipemia    Depression    Chorea    Colon polyps    Encounter for screening for malignant neoplasm of colon    Lung nodules    Generalized anxiety disorder       Impression and Plan:    Cavitary lesions of lung  Abnormal chest CT  Active tobacco use, 1 ppd >10+ yrs    low-dose CT done 2/26/2025 for lung cancer screening which showed several subcentimeter cavitary lesions.  Status post bronchoscopy with BAL.  Cultures positive for Candida albicans.  Negative for malignant cells.  Repeat chest CT 5/27/2025 showed improving bilateral cavitary lesion favoring infectious or inflammatory process  We will repeat chest CT in November to ensure resolution  Encouraged tobacco cessation  Follow-up after CT    Vaccination status: Up-to-date    Medications personally reviewed.    Follow Up   No follow-ups on file.  Patient was given instructions and counseling regarding her condition or for health maintenance advice. Please see specific information pulled into the AVS if appropriate.

## 2025-07-10 ENCOUNTER — OFFICE VISIT (OUTPATIENT)
Age: 70
End: 2025-07-10
Payer: MEDICARE

## 2025-07-10 VITALS
HEIGHT: 68 IN | SYSTOLIC BLOOD PRESSURE: 130 MMHG | WEIGHT: 152 LBS | OXYGEN SATURATION: 96 % | HEART RATE: 80 BPM | DIASTOLIC BLOOD PRESSURE: 78 MMHG | BODY MASS INDEX: 23.04 KG/M2

## 2025-07-10 DIAGNOSIS — F33.1 MODERATE EPISODE OF RECURRENT MAJOR DEPRESSIVE DISORDER: Primary | ICD-10-CM

## 2025-07-10 DIAGNOSIS — F41.1 GENERALIZED ANXIETY DISORDER: ICD-10-CM

## 2025-07-10 NOTE — PROGRESS NOTES
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 03/17/2025 N/A  Not Detected, N/A Final    KPC Gene 03/17/2025 N/A  Not Detected, N/A Final    mecA/C and MREJ Gene 03/17/2025 N/A  Not Detected, N/A Final    NDM Gene 03/17/2025 N/A  Not Detected, N/A Final    OXA-48-like Gene 03/17/2025 N/A  Not Detected, N/A Final    VIM Gene 03/17/2025 N/A  Not Detected, N/A Final    Coronavirus 03/17/2025 Not Detected  Not Detected Final    Human Metapneumovirus 03/17/2025 Not Detected  Not Detected Final    Human Rhinovirus/Enterovirus 03/17/2025 Not Detected  Not Detected Final    Influenza A PCR 03/17/2025 Not Detected  Not Detected Final    Influenza B PCR 03/17/2025 Not Detected  Not Detected Final    RSV, PCR 03/17/2025 Not Detected  Not Detected Final    Parainfluenza virus PCR 03/17/2025 Not Detected  Not Detected Final    Lymphocytes, Fluid % 03/17/2025 66  % Final    Macrophage, Fluid % 03/17/2025 3  % Final    Bronchial Lining Cells 03/17/2025 31  % Final    Fungus Culture 03/17/2025 Moderate growth (3+) Candida albicans (A)   Final    AFB Culture 03/17/2025 No AFB isolated at  6 weeks   Final    AFB Stain 03/17/2025 No acid fast bacilli seen on direct smear   Final    AFB Stain 03/17/2025 No acid fast bacilli seen on concentrated smear   Final    Respiratory Culture 03/17/2025 Light growth (2+) Normal respiratory kathrine. No S. aureus or Pseudomonas aeruginosa detected. Final report.   Final    Gram Stain 03/17/2025 Occasional Gram positive cocci in pairs   Final    Gram Stain 03/17/2025 Occasional Gram negative bacilli, tiny   Final   Lab on 02/04/2025   Component Date Value Ref Range Status    Hemoglobin A1C 02/04/2025 7.70 (H)  4.80 - 5.60 % Final     EKG Results:  No orders to display    Imaging Results:  CT Chest Without Contrast Diagnostic  Result Date: 6/1/2025  Impression: 1. Improving bilateral cavitary lesions in the lung fields favoring infectious or inflammatory process. Recommend a follow-up CT scan of the chest in 3 months. 2. Coronary artery calcification. Electronically Signed: Rajan Rosa MD  6/1/2025 5:11 PM EDT  Workstation ID: [id number]    ASSESSMENT AND PLAN:    ICD-10-CM ICD-9-CM   1. Moderate episode of recurrent major depressive disorder  F33.1 296.32   2. Generalized anxiety disorder  F41.1 300.02       70 y.o. female who presents today for follow up regarding  MDD and JUAN C. We have discussed the history and interpreted diagnoses as above as well as the treatment plan below, including potential of risk/benefits/side effects of the recommended regimen of which the patient demonstrates understanding. Patient is agreeable to call 911 or go to the nearest ER should she become concerned for her own safety and/or the safety of those around her. There are no overt changes on exam today compared to most recent evaluation.    Medication regimen: increase vortioxetine to 20 mg qd, continue cariprazine 3 mg qd, continue citalopram 10 mg qd; patient is advised not to misuse prescribed medications or to use any exogenous substances that aren't disclosed to this provider as they  may interact with the regimen to her detriment. Patient is agreeable to plan.  Risk Assessment: protracted risk is low, imminent risk is low. Risk factors include: anxiety disorder, mood disorder, and recent/ongoing psychosocial stressors. Protective factors include: intact reality testing, no substance use disorder, no access to firearms, no present SI, no stated history of suicide attempts or self-harm, patient's exhibited future-orientation, and patient's cooperation with care. Do note that this is subject to change with the [employer] of new stressors, treatment non-adherence, use of substances, and/or new medical ails.  Monitoring: reviewed labs/imaging as populated above, no labs ordered  Therapy: Laura q 2 weeks  Follow-up: Return in about 6 weeks (around 8/21/2025).  Treatment plan: due 08/14/25, completed 05/14/25   Communications: N/A    TREATMENT PLAN/GOALS: challenge patterns of living conducive to symptom burden, implement recommended regimen as above with augmentative, intermittent supportive psychotherapy to reduce symptom burden. Patient acknowledged and verbally consented to continue treatment. The importance of adherence to the recommended treatment and interval follow-up appointments was again emphasized today: patient has good treatment adherence per given history. Patient was today reminded to limit daily caffeine intake, hydrate appropriately, eat healthy and nutritious foods, engage sleep hygiene measures, engage appropriate exposure to sunlight, engage with hobbies in balance with life necessities, and exercise appropriate to their capacity to do so.    Billing: Additionally, I provided 16 minutes of dedicated psychotherapy to the patient, distinct from E/M services, as documented above. Start time: 1425. Stop time: 1441.    Parts of this note are electronic transcriptions/translations of spoken language to printed text using the Dragon Dictation system.    Electronically signed by Herlinda Huber  JOSEFINA, 07/10/25, 0424 EDT  Answers submitted by the patient for this visit:  Chronic Condition Follow-up (Submitted on 7/4/2025)  Chief Complaint: PCP follow-up  Medication compliance: all of the time  Side effects: change in bowel movements  Treatment barriers: medication side effects  Exercise: never

## 2025-08-19 ENCOUNTER — TELEPHONE (OUTPATIENT)
Dept: ADMINISTRATIVE | Facility: OTHER | Age: 70
End: 2025-08-19
Payer: MEDICARE

## (undated) DEVICE — THE TORRENT IRRIGATION SCOPE CONNECTOR IS USED WITH THE TORRENT IRRIGATION TUBING TO PROVIDE IRRIGATION FLUIDS SUCH AS STERILE WATER DURING GASTROINTESTINAL ENDOSCOPIC PROCEDURES WHEN USED IN CONJUNCTION WITH AN IRRIGATION PUMP (OR ELECTROSURGICAL UNIT).: Brand: TORRENT

## (undated) DEVICE — SINGLE USE SUCTION VALVE MAJ-209: Brand: SINGLE USE SUCTION VALVE (STERILE)

## (undated) DEVICE — SENSR O2 OXIMAX FNGR A/ 18IN NONSTR

## (undated) DEVICE — CANN NASL CO2 TRULINK W/O2 A/

## (undated) DEVICE — CONTAINER,SPEC,PNEUM TUBE,3OZ,STRL PATH: Brand: MEDLINE

## (undated) DEVICE — LINER SURG CANSTR SXN S/RIGD 1500CC

## (undated) DEVICE — SYR SLP TP 10ML DISP

## (undated) DEVICE — ADAPT SWVL FIBROPTIC BRONCH

## (undated) DEVICE — DEV ATOMIZATION MUCOSAL/NASALTRACH

## (undated) DEVICE — SINGLE USE BIOPSY VALVE MAJ-210: Brand: SINGLE USE BIOPSY VALVE (STERILE)

## (undated) DEVICE — THE SINGLE USE ETRAP – POLYP TRAP IS USED FOR SUCTION RETRIEVAL OF ENDOSCOPICALLY REMOVED POLYPS.: Brand: ETRAP

## (undated) DEVICE — KT COMPLIANCE ENDO PREV INFCT

## (undated) DEVICE — SOLIDIFIER LIQLOC PLS 1500CC BT

## (undated) DEVICE — SNAR POLYP SENSATION STDOVL 27 240 BX40

## (undated) DEVICE — SYR LUER SLPTP 50ML

## (undated) DEVICE — TRAP,MUCUS SPECIMEN,40CC: Brand: MEDLINE

## (undated) DEVICE — BLCK/BITE BLOX WO/DENTL/RIM W/STRAP 54F

## (undated) DEVICE — Device: Brand: DEFENDO AIR/WATER/SUCTION AND BIOPSY VALVE

## (undated) DEVICE — NDL FLTR BLNT 18G 1 1/2IN

## (undated) DEVICE — SOL IRR NACL 0.9PCT BO 500ML

## (undated) DEVICE — TUBING, SUCTION, 1/4" X 10', STRAIGHT: Brand: MEDLINE